# Patient Record
Sex: FEMALE | Race: WHITE | NOT HISPANIC OR LATINO | Employment: FULL TIME | URBAN - METROPOLITAN AREA
[De-identification: names, ages, dates, MRNs, and addresses within clinical notes are randomized per-mention and may not be internally consistent; named-entity substitution may affect disease eponyms.]

---

## 2017-01-03 ENCOUNTER — OFFICE VISIT (OUTPATIENT)
Dept: PEDIATRICS | Facility: CLINIC | Age: 57
End: 2017-01-03
Payer: COMMERCIAL

## 2017-01-03 VITALS
HEIGHT: 67 IN | BODY MASS INDEX: 23.39 KG/M2 | DIASTOLIC BLOOD PRESSURE: 72 MMHG | HEART RATE: 78 BPM | OXYGEN SATURATION: 98 % | TEMPERATURE: 97.9 F | SYSTOLIC BLOOD PRESSURE: 128 MMHG | WEIGHT: 149 LBS

## 2017-01-03 DIAGNOSIS — C44.211: ICD-10-CM

## 2017-01-03 DIAGNOSIS — M54.50 CHRONIC RIGHT-SIDED LOW BACK PAIN WITHOUT SCIATICA: ICD-10-CM

## 2017-01-03 DIAGNOSIS — G89.29 CHRONIC RIGHT-SIDED LOW BACK PAIN WITHOUT SCIATICA: ICD-10-CM

## 2017-01-03 DIAGNOSIS — Z23 ENCOUNTER FOR IMMUNIZATION: ICD-10-CM

## 2017-01-03 DIAGNOSIS — Z00.00 ROUTINE GENERAL MEDICAL EXAMINATION AT A HEALTH CARE FACILITY: Primary | ICD-10-CM

## 2017-01-03 PROCEDURE — 99396 PREV VISIT EST AGE 40-64: CPT | Performed by: INTERNAL MEDICINE

## 2017-01-03 RX ORDER — MULTIPLE VITAMINS W/ MINERALS TAB 9MG-400MCG
1 TAB ORAL DAILY
COMMUNITY

## 2017-01-03 NOTE — PROGRESS NOTES
SUBJECTIVE:     CC: Celeste Alcala is an 56 year old woman who presents for preventive health visit.     Physical  Annual:     Getting at least 3 servings of Calcium per day::  NO    Bi-annual eye exam::  Yes    Dental care twice a year::  Yes    Sleep apnea or symptoms of sleep apnea::  None    Diet::  Regular (no restrictions)    Frequency of exercise::  4-5 days/week    Duration of exercise::  15-30 minutes    Taking medications regularly::  Not Applicable    Additional concerns today::  YES (Cramping, but hasn't had her period, Back Pain )  did not have vaginal bleeding.  No constipation.  Going to [place] next month, staying condo.      Today's PHQ-2 Score:   PHQ-2 ( 1999 [employer]) 1/3/2017   Q1: Little interest or pleasure in doing things 0   Q2: Feeling down, depressed or hopeless 0   PHQ-2 Score 0   Little interest or pleasure in doing things -   Feeling down, depressed or hopeless -   PHQ-2 Score -       Abuse: Current or Past(Physical, Sexual or Emotional)- No  Do you feel safe in your environment - Yes    Social History   Substance Use Topics     Smoking status: Never Smoker      Smokeless tobacco: Never Used     Alcohol Use: Yes      Comment: few drinks per night on weekend     The patient does not drink >3 drinks per day nor >7 drinks per week.    Recent Labs   Lab Test 04/19/11  02/02/10   0918   CHOL  168  171   HDL  70.8  62   LDL  79.2  95   TRIG  90  66   CHOLHDLRATIO   --   2.8       Reviewed orders with patient.  Reviewed health maintenance and updated orders accordingly - Yes    Mammo Decision Support:  Patient over age 50, mutual decision to screen reflected in health maintenance.    Pertinent mammograms are reviewed under the imaging tab.  History of abnormal Pap smear: NO - age 30-65 PAP every 5 years with negative HPV co-testing recommended  All Histories reviewed and updated in Epic.      ROS:  C: NEGATIVE for fever, chills, change in weight  I: NEGATIVE for worrisome rashes, moles or  "lesions  E: NEGATIVE for vision changes or irritation  ENT: NEGATIVE for ear, mouth and throat problems  R: NEGATIVE for significant cough or SOB  B: NEGATIVE for masses, tenderness or discharge  CV: NEGATIVE for chest pain, palpitations or peripheral edema  GI: NEGATIVE for nausea, abdominal pain, heartburn, or change in bowel habits  : NEGATIVE for unusual urinary or vaginal symptoms. No vaginal bleeding.  M: NEGATIVE for significant arthralgias or myalgia other than pain rt low back to buttock without weakness or neuro symptoms   N: NEGATIVE for weakness, dizziness or paresthesias  P: NEGATIVE for changes in mood or affect     Problem list, Medication list, Allergies, and Medical/Social/Surgical histories reviewed in [facility] and updated as appropriate.  Labs reviewed in EPIC  OBJECTIVE:     /72 mmHg  Pulse 78  Temp(Src) 97.9  F (36.6  C) (Oral)  Ht 5' 7\" (1.702 m)  Wt 149 lb (67.586 kg)  BMI 23.33 kg/m2  SpO2 98%  EXAM:  GENERAL APPEARANCE: healthy, alert and no distress  EYES: Eyes grossly normal to inspection, PERRL and conjunctivae and sclerae normal  HENT: ear canals and TM's normal, nose and mouth without ulcers or lesions, oropharynx clear and oral mucous membranes moist  NECK: no adenopathy, no asymmetry, masses, or scars and thyroid normal to palpation  RESP: lungs clear to auscultation - no rales, rhonchi or wheezes  BREAST: normal without masses, tenderness or nipple discharge and no palpable axillary masses or adenopathy  CV: regular rate and rhythm, normal S1 S2, no S3 or S4, no murmur, click or rub, no peripheral edema and peripheral pulses strong  ABDOMEN: soft, nontender, no hepatosplenomegaly, no masses and bowel sounds normal  MS: no musculoskeletal defects are noted and gait is age appropriate without ataxia  SKIN: no suspicious lesions or rashes  NEURO: Normal strength and tone, sensory exam grossly normal, mentation intact and speech normal  PSYCH: mentation appears normal and " "affect normal/bright    ASSESSMENT/PLAN:     1. Routine general medical examination at a health care facility  Routine health education discussed: calcium, diet, exercise, weight, safety.   - Glucose; Future  - Lipid panel reflex to direct LDL; Future  - Hepatitis C Screen Reflex to HCV RNA Quant and Genotype; Future    2. 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 Encounter for immunization  Counseling provided regarding the benefits and risks related to the vaccines ordered today. I reviewed the signs and symptoms of adverse effects and when to seek medical care if they should arise.   - HEPATITIS A VACCINE (ADULT)    4. Chronic right-sided low back pain without sciatica  Refer for PT  - [facility] PT, HAND, AND CHIROPRACTIC REFERRAL    COUNSELING:  Reviewed preventive health counseling, as reflected in patient instructions    BP Screening:   Last 3 BP Readings:    BP Readings from Last 3 Encounters:   01/03/17 128/72   09/22/15 126/92   08/25/15 110/70       The following was recommended to the patient:  Re-screen BP within a year and recommended lifestyle modifications       reports that she has never smoked. She has never used smokeless tobacco.    Estimated body mass index is 22.39 kg/(m^2) as calculated from the following:    Height as of 8/25/15: 5' 7\" (1.702 m).    Weight as of 9/22/15: 143 lb (64.864 kg).       Counseling Resources:  ATP IV Guidelines  Pooled Cohorts Equation Calculator  Breast Cancer Risk Calculator  FRAX Risk Assessment  ICSI Preventive Guidelines  Dietary Guidelines for Americans, 2010  USDA's MyPlate  ASA Prophylaxis  Lung CA Screening    Alicia Fisher MD  [facility] [place]    Answers for HPI/ROS submitted by the patient on 12/29/2016   PHQ-2 Depressed: Not at all, Not at all  PHQ-2 Score: 0      "

## 2017-01-03 NOTE — LETTER
[facility]  [address]  [place] [address]  [phone]      June 16, 2017      Celeste Alcala  [address]  [place] [address]              Dear Celeste Alcala    This is to remind you that your  Hep C Screening, Blood Sugar and Lipid profile is due. You may call our office at [phone] to schedule a lab only appointment.    Please disregard this notice if you have had this lab work done or already made an appointment.     Note: If you are scheduled for a cholesterol or any diabetic lab tests, you will need to fast for 12 hours prior to your lab appointment.        Sincerely,    Alicia Fisher MD

## 2017-01-03 NOTE — PATIENT INSTRUCTIONS
Preventive Health Recommendations  Female Ages 50 - 64    Yearly exam: See your health care provider every year in order to  o Review health changes.   o Discuss preventive care.    o Review your medicines if your doctor has prescribed any.      If you get Pap tests with HPV test, you only need to test every 5 years, unless you have an abnormal result. You are due in 2018      Have a mammogram every 1 to 2 years.    Have a colonoscopy in 2022    Have a cholesterol test every 5 years, or more often if advised.  You are due this year, please schedule a fasting lab-only.  You can have water, black coffee and black tea ONLY for 8hrs prior to the blood draw.     Have a diabetes test (fasting glucose) every three years.       Shots: Get a flu shot each year. Get a tetanus shot every 10 years.  You are due in 2020    Nutrition:     Eat at least 5 servings of fruits and vegetables each day.    Eat whole-grain bread, whole-wheat pasta and brown rice instead of white grains and rice.    Talk to your provider about Calcium and Vitamin D.     Lifestyle    Exercise at least 150 minutes a week (30 minutes a day, 5 days a week). This will help you control your weight and prevent disease.    Limit alcohol to one drink per day.    No smoking.     Wear sunscreen to prevent skin cancer.     See your dentist every six months for an exam and cleaning.    See your eye doctor every 1 to 2 years.    Let me know if the cramping returns and I would order a pelvic ultrasound.    They will call you to schedule physical therapy

## 2017-01-03 NOTE — NURSING NOTE
"Chief Complaint   Patient presents with     Physical       Initial /72 mmHg  Pulse 78  Temp(Src) 97.9  F (36.6  C) (Oral)  Ht 5' 7\" (1.702 m)  Wt 149 lb (67.586 kg)  BMI 23.33 kg/m2  SpO2 98% Estimated body mass index is 23.33 kg/(m^2) as calculated from the following:    Height as of this encounter: 5' 7\" (1.702 m).    Weight as of this encounter: 149 lb (67.586 kg).  BP completed using cuff size: shelby Arellano MA      "

## 2017-01-03 NOTE — MR AVS SNAPSHOT
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   o Discuss preventive care.    o Review your medicines if your doctor has prescribed any.      If you get Pap tests with HPV test, you only need to test every 5 years, unless you have an abnormal result. You are due in 2018      Have a mammogram every 1 to 2 years.    Have a colonoscopy in 2022    Have a cholesterol test every 5 years, or more often if advised.  You are due this year, please schedule a fasting lab-only.  You can have water, black coffee and black tea ONLY for 8hrs prior to the blood draw.     Have a diabetes test (fasting glucose) every three years.       Shots: Get a flu shot each year. Get a tetanus shot every 10 years.  You are due in 2020    Nutrition:     Eat at least 5 servings of fruits and vegetables each day.    Eat whole-grain bread, whole-wheat pasta and brown rice instead of white grains and rice.    Talk to your provider about Calcium and Vitamin D.     Lifestyle    Exercise at least 150 minutes a week (30 minutes a day, 5 days a week). This will help you control your weight and prevent disease.    Limit alcohol to one drink per day.    No smoking.     Wear sunscreen to prevent skin cancer.     See your dentist every six months for an exam and cleaning.    See your eye doctor every 1 to 2  years.    Let me know if the cramping returns and I would order a pelvic ultrasound.    They will call you to schedule physical therapy        Follow-ups after your visit        Additional Services     [facility] PT, HAND, AND CHIROPRACTIC REFERRAL       === This order will print in the [facility] Scheduling  Office ===    Physical therapy, hand therapy and chiropractic care are available through:    [facility]  [facility]    Call one easy number to schedule at any of the above locations:  [phone].    Your provider has referred you to Physical Therapy at [facility] or [facility]    Indication/Reason for Referral: back pain  Onset of Illness:  yr  Therapy Orders:  Evaluate and Treat  Special Programs:  None  Special Request:  None    Additional Comments for the therapist or chiropractor:      Please be aware that coverage of these services is subject to the terms and limitations of your health insurance plan.  Call member services at your health plan with any benefit or coverage questions.      Please bring the following to your appointment:    >>   Your personal calendar for scheduling future appointments  >>   Comfortable clothing                  Future tests that were ordered for you today     Open Future Orders        Priority Expected Expires Ordered    Glucose Routine 3/4/2017 4/3/2017 1/3/2017    Lipid panel reflex to direct LDL Routine 3/4/2017 4/5/2017 1/3/2017    Hepatitis C Screen Reflex to HCV RNA Quant and Genotype Routine 4/3/2017 5/3/2017 1/3/2017            Who to contact     If you have questions or need follow up information about today's clinic visit or your schedule please contact [facility] [place] directly at [phone].  Normal or non-critical lab and imaging results will be communicated to you by MyChart, letter or phone within 4 business days after the clinic has received the results. If you do not hear from us within 7 days, please contact  "the clinic through [employer] or phone. If you have a critical or abnormal lab result, we will notify you by phone as soon as possible.  Submit refill requests through [employer] or call your pharmacy and they will forward the refill request to us. Please allow 3 business days for your refill to be completed.          Additional Information About Your Visit        [employer] Information     [employer] gives you secure access to your electronic health record. If you see a primary care provider, you can also send messages to your care team and make appointments. If you have questions, please call your primary care clinic.  If you do not have a primary care provider, please call [phone] and they will assist you.        Care EveryWhere ID     This is your Care EveryWhere ID. This could be used by other organizations to access your [facility] medical records  [id number]        Your Vitals Were     Pulse Temperature Height BMI (Body Mass Index) Pulse Oximetry       78 97.9  F (36.6  C) (Oral) 5' 7\" (1.702 m) 23.33 kg/m2 98%        Blood Pressure from Last 3 Encounters:   01/03/17 128/72   09/22/15 126/92   08/25/15 110/70    Weight from Last 3 Encounters:   01/03/17 149 lb (67.586 kg)   09/22/15 143 lb (64.864 kg)   08/25/15 146 lb 8 oz (66.452 kg)              We Performed the Following     HEPATITIS A VACCINE (ADULT)     [facility] PT, HAND, AND CHIROPRACTIC REFERRAL        Primary Care Provider Office Phone # Fax #    Alicia Fisher [phone]       [facility] [address] [place] [address]        Thank you!     Thank you for choosing [facility]  for your care. Our goal is always to provide you with excellent care. Hearing back from our patients is one way we can continue to improve our services. Please take a few minutes to complete the written survey that you may receive in the mail after your visit with us. Thank you!             Your Updated Medication List - Protect others around you: " Learn how to safely use, store and throw away your medicines at www.disposemymeds.org.          This list is accurate as of: 1/3/17  5:09 PM.  Always use your most recent med list.                   Brand Name Dispense Instructions for use    CALCIUM CARBONATE PO          fluticasone 50 MCG/ACT spray    FLONASE    16 g    Spray 1-2 sprays into both nostrils daily       Multi-vitamin Tabs tablet      Take 1 tablet by mouth daily

## 2017-01-05 ENCOUNTER — THERAPY VISIT (OUTPATIENT)
Dept: PHYSICAL THERAPY | Facility: CLINIC | Age: 57
End: 2017-01-05
Payer: COMMERCIAL

## 2017-01-05 DIAGNOSIS — M54.50 LUMBAGO: Primary | ICD-10-CM

## 2017-01-05 PROCEDURE — 97110 THERAPEUTIC EXERCISES: CPT | Mod: GP | Performed by: PHYSICAL THERAPIST

## 2017-01-05 PROCEDURE — 97161 PT EVAL LOW COMPLEX 20 MIN: CPT | Mod: GP | Performed by: PHYSICAL THERAPIST

## 2017-01-05 NOTE — PROGRESS NOTES
Subjective:                                       Pertinent medical history includes:  Cancer.    Other surgeries include:  Orthopedic surgery (Knee).    Current occupation is .    Primary job tasks include:  Prolonged sitting and other (Computer work).                              Objective:    System    Physical Exam    General     ROS    Assessment/Plan:

## 2017-01-12 ENCOUNTER — THERAPY VISIT (OUTPATIENT)
Dept: PHYSICAL THERAPY | Facility: CLINIC | Age: 57
End: 2017-01-12
Payer: COMMERCIAL

## 2017-01-12 DIAGNOSIS — M54.50 LUMBAGO: Primary | ICD-10-CM

## 2017-01-12 PROCEDURE — 97112 NEUROMUSCULAR REEDUCATION: CPT | Mod: GP | Performed by: PHYSICAL THERAPIST

## 2017-01-12 PROCEDURE — 97110 THERAPEUTIC EXERCISES: CPT | Mod: GP | Performed by: PHYSICAL THERAPIST

## 2017-01-23 ENCOUNTER — THERAPY VISIT (OUTPATIENT)
Dept: PHYSICAL THERAPY | Facility: CLINIC | Age: 57
End: 2017-01-23
Payer: COMMERCIAL

## 2017-01-23 DIAGNOSIS — M54.50 LUMBAGO: Primary | ICD-10-CM

## 2017-01-23 PROCEDURE — 97110 THERAPEUTIC EXERCISES: CPT | Mod: GP | Performed by: PHYSICAL THERAPIST

## 2017-01-23 PROCEDURE — 97112 NEUROMUSCULAR REEDUCATION: CPT | Mod: GP | Performed by: PHYSICAL THERAPIST

## 2017-03-23 NOTE — PROGRESS NOTES
"Subjective:    HPI                    Objective:    System    Physical Exam    General     ROS    Assessment/Plan:      DISCHARGE REPORT    Progress reporting period is from 1/5/2017 to 3/23/2017.       SUBJECTIVE  Patient's current status is unknown.  She did not complete therapy as planned.  Subjective note when patient was last seen on 1/23/2017: Patient reports symptoms are improving. HEP is going well.  Sitting tolerance is improving.  She reports less \"sciatic\" pain.        OBJECTIVE  Patient has failed to return to therapy so current objective findings are unknown. The objective findings below are from DOS 1/23/2017: Lumbar extension ROM: 90% after Rx.     ASSESSMENT/PLAN  Updated problem list and treatment plan: Diagnosis 1:  Lumbar derangement    STG/LTGs have been met or progress has been made towards goals:  Yes  Assessment of Progress: The patient has not returned to therapy. Current status is unknown.  Self Management Plans:  Patient has been instructed in a home treatment program.  Patient is independent in a home treatment program.    D/C PT since Celeste has not returned.        "

## 2018-02-14 ENCOUNTER — TELEPHONE (OUTPATIENT)
Dept: PEDIATRICS | Facility: CLINIC | Age: 58
End: 2018-02-14

## 2018-02-14 NOTE — TELEPHONE ENCOUNTER
Panel Management Review      Patient has the following on her problem list: None      Composite cancer screening  Chart review shows that this patient is due/due soon for the following Mammogram  Summary:    Patient is due/failing the following:   MAMMOGRAM and PHYSICAL    Action needed:   Patient needs office visit for physical. and Patient needs referral/order: mammogram    Type of outreach:    Sent [employer] message.    Questions for provider review:    None                                                                                                                                    Adriana Kuhn MA       Chart routed to self .

## 2018-02-21 ENCOUNTER — RADIANT APPOINTMENT (OUTPATIENT)
Dept: MAMMOGRAPHY | Facility: CLINIC | Age: 58
End: 2018-02-21
Attending: INTERNAL MEDICINE
Payer: COMMERCIAL

## 2018-02-21 DIAGNOSIS — Z00.00 PREVENTATIVE HEALTH CARE: ICD-10-CM

## 2018-02-21 PROCEDURE — 77067 SCR MAMMO BI INCL CAD: CPT | Mod: TC

## 2018-02-21 PROCEDURE — 77063 BREAST TOMOSYNTHESIS BI: CPT | Mod: TC

## 2018-03-02 NOTE — TELEPHONE ENCOUNTER
Panel Management Review      Patient has the following on her problem list: None      Composite cancer screening  Chart review shows that this patient is due/due soon for the following None  Summary:    Patient is due/failing the following:   LDL and PHYSICAL    Action needed:   Patient needs office visit for physical.    Type of outreach:    Phone, left message for patient to call back.     Questions for provider review:    None                                                                                                                                    Adriana Kuhn MA       Chart routed to none . 2nd attempt

## 2018-03-14 NOTE — TELEPHONE ENCOUNTER
Panel Management Review      Patient has the following on her problem list: None      Composite cancer screening  Chart review shows that this patient is due/due soon for the following None  Summary:    Patient is due/failing the following:   LDL and PHYSICAL    Action needed:   Patient needs office visit for physical.    Type of outreach:    none pt has appt scheduled 4/3/18.    Questions for provider review:    None                                                                                                                                    Adriana Kuhn MA       Chart routed to none .  Postponing until scheduled date

## 2018-04-03 ENCOUNTER — OFFICE VISIT (OUTPATIENT)
Dept: PEDIATRICS | Facility: CLINIC | Age: 58
End: 2018-04-03
Payer: COMMERCIAL

## 2018-04-03 VITALS
DIASTOLIC BLOOD PRESSURE: 82 MMHG | WEIGHT: 142.5 LBS | TEMPERATURE: 98.9 F | HEIGHT: 67 IN | BODY MASS INDEX: 22.37 KG/M2 | HEART RATE: 76 BPM | OXYGEN SATURATION: 99 % | SYSTOLIC BLOOD PRESSURE: 124 MMHG

## 2018-04-03 DIAGNOSIS — Z13.220 LIPID SCREENING: ICD-10-CM

## 2018-04-03 DIAGNOSIS — Z13.1 SCREENING FOR DIABETES MELLITUS: ICD-10-CM

## 2018-04-03 DIAGNOSIS — M70.61 TROCHANTERIC BURSITIS OF RIGHT HIP: ICD-10-CM

## 2018-04-03 DIAGNOSIS — Z12.4 SCREENING FOR CERVICAL CANCER: ICD-10-CM

## 2018-04-03 DIAGNOSIS — Z13.6 CARDIOVASCULAR SCREENING; LDL GOAL LESS THAN 160: ICD-10-CM

## 2018-04-03 DIAGNOSIS — Z00.00 ROUTINE GENERAL MEDICAL EXAMINATION AT A HEALTH CARE FACILITY: Primary | ICD-10-CM

## 2018-04-03 DIAGNOSIS — Z11.59 NEED FOR HEPATITIS C SCREENING TEST: ICD-10-CM

## 2018-04-03 PROCEDURE — 87624 HPV HI-RISK TYP POOLED RSLT: CPT | Performed by: INTERNAL MEDICINE

## 2018-04-03 PROCEDURE — G0123 SCREEN CERV/VAG THIN LAYER: HCPCS | Performed by: INTERNAL MEDICINE

## 2018-04-03 PROCEDURE — 99396 PREV VISIT EST AGE 40-64: CPT | Performed by: INTERNAL MEDICINE

## 2018-04-03 ASSESSMENT — ENCOUNTER SYMPTOMS
ARTHRALGIAS: 1
NAUSEA: 0
COUGH: 0
WEAKNESS: 0
HEADACHES: 0
SHORTNESS OF BREATH: 0
HEMATOCHEZIA: 0
DYSURIA: 0
CONSTIPATION: 0
FEVER: 0
PALPITATIONS: 0
DIARRHEA: 0
JOINT SWELLING: 0
HEMATURIA: 0
HEARTBURN: 0
ABDOMINAL PAIN: 0
EYE PAIN: 0
NERVOUS/ANXIOUS: 0
FREQUENCY: 0
DIZZINESS: 0
SORE THROAT: 0
CHILLS: 0
PARESTHESIAS: 0

## 2018-04-03 NOTE — LETTER
April 11, 2018    Celeste Alcala  [address]  [place] [address]    Dear Celeste,  We are happy to inform you that your PAP smear result from 4/3/18 is normal.  We are now able to do a follow up test on PAP smears. The DNA test is for HPV (Human Papilloma Virus). Cervical cancer is closely linked with certain types of HPV. Your results showed no evidence of high risk HPV.  Therefore we recommend you return in 5 years for your next pap smear and HPV test.  You will still need to return to the clinic every year for an annual exam and other preventive tests.  Please contact the clinic at [phone] with any questions.  Sincerely,    Alicia Fisher MD/matthew

## 2018-04-03 NOTE — PATIENT INSTRUCTIONS
Preventive Health Recommendations  Female Ages 50 - 64    Yearly exam: See your health care provider every year in order to  o Review health changes.   o Discuss preventive care.    o Review your medicines if your doctor has prescribed any.      If you get Pap tests with HPV test, you only need to test every 5 years, unless you have an abnormal result.       Have a mammogram every 1 to 2 years.    Have a colonoscopy in 2022      Have a cholesterol test every 5 years, or more often if advised.    Have a diabetes test (fasting glucose) every three years. If you are at risk for diabetes, you should have this test more often.     Schedule your fasting labs.  You can have You can schedule on [employer] or by calling us at [phone].       Shots: Get a flu shot each year. Get a tetanus shot every 10 years.  You are due in 2020.    Nutrition:     Eat at least 5 servings of fruits and vegetables each day.    Eat whole-grain bread, whole-wheat pasta and brown rice instead of white grains and rice.    Talk to your provider about Calcium and Vitamin D.     Lifestyle    Exercise at least 150 minutes a week (30 minutes a day, 5 days a week). This will help you control your weight and prevent disease.    Limit alcohol to one drink per day.    No smoking.     Wear sunscreen to prevent skin cancer.     See your dentist every six months for an exam and cleaning.    See your eye doctor every 1 to 2 years.    Try putting allergy proof covers on your pillows to see if it helps your congestion.    Try taking 2 aleve twice daily with food for 2 weeks to see if that will just decrease your inflammation and stop the pain.  If that does not do the trick, send me a message - either physical therapy or seeing sports medicine specialist is the next step.

## 2018-04-03 NOTE — MR AVS SNAPSHOT
After Visit Summary   4/3/2018    Celeste Alcala    MRN: [id number]           Patient Information     Date Of Birth          1960        Visit Information        Provider Department      4/3/2018 5:50 PM Alicia Fisher MD [facility]        Today's Diagnoses     Routine general medical examination at a health care facility    -  1    CARDIOVASCULAR SCREENING; LDL GOAL LESS THAN 160        Lipid screening        Screening for diabetes mellitus        Need for hepatitis C screening test        Screening for cervical cancer        Trochanteric bursitis of right hip          Care Instructions      Preventive Health Recommendations  Female Ages 50 - 64    Yearly exam: See your health care provider every year in order to  o Review health changes.   o Discuss preventive care.    o Review your medicines if your doctor has prescribed any.      If you get Pap tests with HPV test, you only need to test every 5 years, unless you have an abnormal result.       Have a mammogram every 1 to 2 years.    Have a colonoscopy in 2022      Have a cholesterol test every 5 years, or more often if advised.    Have a diabetes test (fasting glucose) every three years. If you are at risk for diabetes, you should have this test more often.     Schedule your fasting labs.  You can have You can schedule on [employer] or by calling us at [phone].       Shots: Get a flu shot each year. Get a tetanus shot every 10 years.  You are due in 2020.    Nutrition:     Eat at least 5 servings of fruits and vegetables each day.    Eat whole-grain bread, whole-wheat pasta and brown rice instead of white grains and rice.    Talk to your provider about Calcium and Vitamin D.     Lifestyle    Exercise at least 150 minutes a week (30 minutes a day, 5 days a week). This will help you control your weight and prevent disease.    Limit alcohol to one drink per day.    No smoking.     Wear sunscreen to prevent skin cancer.     See  your dentist every six months for an exam and cleaning.    See your eye doctor every 1 to 2 years.    Try putting allergy proof covers on your pillows to see if it helps your congestion.    Try taking 2 aleve twice daily with food for 2 weeks to see if that will just decrease your inflammation and stop the pain.  If that does not do the trick, send me a message - either physical therapy or seeing sports medicine specialist is the next step.            Follow-ups after your visit        Follow-up notes from your care team     Return in about 1 year (around 4/3/2019) for Physical Exam.      Future tests that were ordered for you today     Open Future Orders        Priority Expected Expires Ordered    Lipid panel reflex to direct LDL Fasting Routine 4/3/2018 4/3/2019 4/3/2018    **Glucose FUTURE anytime Routine 4/3/2018 4/3/2019 4/3/2018    **Hepatitis C Screen Reflex to RNA FUTURE anytime Routine 4/3/2018 4/3/2019 4/3/2018            Who to contact     If you have questions or need follow up information about today's clinic visit or your schedule please [facility] at [phone].  Normal or non-critical lab and imaging results will be communicated to you by [employer], letter or phone within 4 business days after the clinic has received the results. If you do not hear from us within 7 days, please contact the clinic through [employer] or phone. If you have a critical or abnormal lab result, we will notify you by phone as soon as possible.  Submit refill requests through [employer] or call your pharmacy and they will forward the refill request to us. Please allow 3 business days for your refill to be completed.          Additional Information About Your Visit        [employer] Information     [employer] gives you secure access to your electronic health record. If you see a primary care provider, you can also send messages to your care team and make appointments. If you have questions, please call your  "primary care clinic.  If you do not have a primary care provider, please call [phone] and they will assist you.        Care EveryWhere ID     This is your Care EveryWhere ID. This could be used by other organizations to access your [facility] medical records  [id number]        Your Vitals Were     Pulse Temperature Height Pulse Oximetry BMI (Body Mass Index)       76 98.9  F (37.2  C) (Tympanic) 5' 7\" (1.702 m) 99% 22.32 kg/m2        Blood Pressure from Last 3 Encounters:   04/03/18 124/82   01/03/17 128/72   09/22/15 (!) 126/92    Weight from Last 3 Encounters:   04/03/18 142 lb 8 oz (64.6 kg)   01/03/17 149 lb (67.6 kg)   09/22/15 143 lb (64.9 kg)              We Performed the Following     HPV High Risk Types DNA Cervical     Pap imaged thin layer screen with HPV - recommended age 30 - 65 years (select HPV order below)        Primary Care Provider Office Phone # Fax #    Alicia Fisher [phone] [address] [place] [address]        Equal Access to Services     [facility]: Hadii aad ravi hadasho Soladonnaali, waaxda luqadaha, qaybta kaalmada adewilmerda, merlyn benavides. So [facility] [phone].    ATENCIÓN: Si habla español, tiene a mota disposición servicios gratuitos de asistencia lingüística. Llame al [phone].    We comply with applicable federal civil rights laws and [place] laws. We do not discriminate on the basis of race, color, national origin, age, disability, sex, sexual orientation, or gender identity.            Thank you!     Thank you for choosing [facility] [place]  for your care. Our goal is always to provide you with excellent care. Hearing back from our patients is one way we can continue to improve our services. Please take a few minutes to complete the written survey that you may receive in the mail after your visit with us. Thank you!             Your Updated Medication List - Protect others around you: Learn how to " safely use, store and throw away your medicines at www.disposemymeds.org.          This list is accurate as of 4/3/18  6:18 PM.  Always use your most recent med list.                   Brand Name Dispense Instructions for use Diagnosis    CALCIUM CARBONATE PO           fluticasone 50 MCG/ACT spray    FLONASE    16 g    Spray 1-2 sprays into both nostrils daily    Nasal congestion       Multi-vitamin Tabs tablet      Take 1 tablet by mouth daily

## 2018-04-03 NOTE — PROGRESS NOTES
SUBJECTIVE:   CC: Celeste Alcala is an 58 year old woman who presents for preventive health visit.     Physical   Annual:     Getting at least 3 servings of Calcium per day::  Yes    Bi-annual eye exam::  Yes    Dental care twice a year::  Yes    Sleep apnea or symptoms of sleep apnea::  Excessive snoring    Diet::  Regular (no restrictions)    Frequency of exercise::  4-5 days/week    Duration of exercise::  15-30 minutes    Taking medications regularly::  No    Barriers to taking medications::  Problems remembering to take them    Medication side effects::  None    Additional concerns today::  YES (back/hip pain x 6months)           congested at night.    Back and hip pains - did some PT sessions a year ago.  Not sure that it helped.  Did do home exercises.  This summer got back pain, more in rt than lt - just above waistband.  Went to chiropracter.  First day after manipulations, felt better.  Rt lat hip pain, intermittent but very bothersome, chelsea in some positions - sitting and sleeping.      Today's PHQ-2 Score:   PHQ-2 ( 1999 [employer]) 4/3/2018   Q1: Little interest or pleasure in doing things 0   Q2: Feeling down, depressed or hopeless 0   PHQ-2 Score 0   Q1: Little interest or pleasure in doing things Not at all   Q2: Feeling down, depressed or hopeless Not at all   PHQ-2 Score 0       Abuse: Current or Past(Physical, Sexual or Emotional)- No  Do you feel safe in your environment - Yes    Social History   Substance Use Topics     Smoking status: Never Smoker     Smokeless tobacco: Never Used     Alcohol use Yes      Comment: few drinks per night on weekend     Alcohol Use 4/3/2018   If you drink alcohol do you typically have greater than 3 drinks per day OR greater than 7 drinks per week? No       Reviewed orders with patient.  Reviewed health maintenance and updated orders accordingly - Yes  Labs reviewed in [facility]    Patient over age 50, mutual decision to screen reflected in health  "maintenance.    Pertinent mammograms are reviewed under the imaging tab.  History of abnormal Pap smear: NO - age 30-65 PAP every 5 years with negative HPV co-testing recommended    Reviewed and updated as needed this visit by clinical staff  Tobacco  Allergies  Meds  Problems  Med Hx  Surg Hx  Fam Hx  Soc Hx          Reviewed and updated as needed this visit by Provider  Allergies  Meds  Problems            Review of Systems   Constitutional: Negative for chills and fever.   HENT: Positive for congestion. Negative for ear pain, hearing loss and sore throat.    Eyes: Positive for visual disturbance. Negative for pain.   Respiratory: Negative for cough and shortness of breath.    Cardiovascular: Negative for chest pain, palpitations and peripheral edema.   Gastrointestinal: Negative for abdominal pain, constipation, diarrhea, heartburn, hematochezia and nausea.   Genitourinary: Negative for dysuria, frequency, genital sores, hematuria, pelvic pain, urgency, vaginal bleeding and vaginal discharge.   Musculoskeletal: Positive for arthralgias. Negative for joint swelling.   Skin: Negative for rash.   Neurological: Negative for dizziness, weakness, headaches and paresthesias.   Psychiatric/Behavioral: Negative for mood changes. The patient is not nervous/anxious.    intermittently notes both eyes blurry vision.  Then will go away, not sure how long it lasts.     OBJECTIVE:   /82 (BP Location: Right arm, Patient Position: Chair, Cuff Size: Adult Regular)  Pulse 76  Temp 98.9  F (37.2  C) (Tympanic)  Ht 5' 7\" (1.702 m)  Wt 142 lb 8 oz (64.6 kg)  SpO2 99%  BMI 22.32 kg/m2  Physical Exam  GENERAL: healthy, alert and no distress  EYES: Eyes grossly normal to inspection, PERRL and conjunctivae and sclerae normal  HENT: ear canals and TM's normal, nose and mouth without ulcers or lesions  NECK: no adenopathy, no asymmetry, masses, or scars and thyroid normal to palpation  RESP: lungs clear to auscultation " "- no rales, rhonchi or wheezes  CV: regular rate and rhythm, normal S1 S2, no S3 or S4, no murmur, click or rub, no peripheral edema and peripheral pulses strong  ABDOMEN: soft, nontender, no hepatosplenomegaly, no masses and bowel sounds normal   (female): normal female external genitalia, normal urethral meatus , vaginal mucosa pink, moist, well rugated and normal cervix  MS: no gross musculoskeletal defects noted, no edema  SKIN: no suspicious lesions or rashes  NEURO: Normal strength and tone, mentation intact and speech normal  PSYCH: mentation appears normal, affect normal/bright    ASSESSMENT/PLAN:   1. Routine general medical examination at a health care facility  Routine health education discussed: calcium, diet, exercise, weight, safety.     2. CARDIOVASCULAR SCREENING; LDL GOAL LESS THAN 160    - Lipid panel reflex to direct LDL Fasting; Future    3. Lipid screening      4. Screening for diabetes mellitus    - **Glucose FUTURE anytime; Future    5. Need for hepatitis C screening test    - **Hepatitis C Screen Reflex to RNA FUTURE anytime; Future    6. Screening for cervical cancer    - Pap imaged thin layer screen with HPV - recommended age 30 - 65 years (select HPV order below)  - HPV High Risk Types DNA Cervical    7. Trochanteric bursitis of right hip  nsaid and notify if not better 2 weeks for referral      COUNSELING:  Reviewed preventive health counseling, as reflected in patient instructions    BP Screening:   Last 3 BP Readings:    BP Readings from Last 3 Encounters:   04/03/18 124/82   01/03/17 128/72   09/22/15 (!) 126/92       The following was recommended to the patient:  Re-screen BP within a year and recommended lifestyle modifications     reports that she has never smoked. She has never used smokeless tobacco.    Estimated body mass index is 22.32 kg/(m^2) as calculated from the following:    Height as of this encounter: 5' 7\" (1.702 m).    Weight as of this encounter: 142 lb 8 oz (64.6 " kg).       Counseling Resources:  ATP IV Guidelines  Pooled Cohorts Equation Calculator  Breast Cancer Risk Calculator  FRAX Risk Assessment  ICSI Preventive Guidelines  Dietary Guidelines for Americans, 2010  USDA's MyPlate  ASA Prophylaxis  Lung CA Screening    Alicia Fisher MD  [facility]

## 2018-04-09 LAB
COPATH REPORT: NORMAL
PAP: NORMAL

## 2018-04-10 LAB
FINAL DIAGNOSIS: NORMAL
HPV HR 12 DNA CVX QL NAA+PROBE: NEGATIVE
HPV16 DNA SPEC QL NAA+PROBE: NEGATIVE
HPV18 DNA SPEC QL NAA+PROBE: NEGATIVE
SPECIMEN DESCRIPTION: NORMAL
SPECIMEN SOURCE CVX/VAG CYTO: NORMAL

## 2018-05-21 ENCOUNTER — RADIANT APPOINTMENT (OUTPATIENT)
Dept: GENERAL RADIOLOGY | Facility: CLINIC | Age: 58
End: 2018-05-21
Attending: FAMILY MEDICINE
Payer: COMMERCIAL

## 2018-05-21 ENCOUNTER — OFFICE VISIT (OUTPATIENT)
Dept: ORTHOPEDICS | Facility: CLINIC | Age: 58
End: 2018-05-21
Payer: COMMERCIAL

## 2018-05-21 VITALS
DIASTOLIC BLOOD PRESSURE: 76 MMHG | HEIGHT: 67 IN | BODY MASS INDEX: 22.13 KG/M2 | WEIGHT: 141 LBS | SYSTOLIC BLOOD PRESSURE: 128 MMHG

## 2018-05-21 DIAGNOSIS — G89.29 CHRONIC RIGHT-SIDED LOW BACK PAIN, WITH SCIATICA PRESENCE UNSPECIFIED: Primary | ICD-10-CM

## 2018-05-21 DIAGNOSIS — G89.29 CHRONIC RIGHT-SIDED LOW BACK PAIN, WITH SCIATICA PRESENCE UNSPECIFIED: ICD-10-CM

## 2018-05-21 DIAGNOSIS — M54.5 CHRONIC RIGHT-SIDED LOW BACK PAIN, WITH SCIATICA PRESENCE UNSPECIFIED: Primary | ICD-10-CM

## 2018-05-21 DIAGNOSIS — M54.5 CHRONIC RIGHT-SIDED LOW BACK PAIN, WITH SCIATICA PRESENCE UNSPECIFIED: ICD-10-CM

## 2018-05-21 DIAGNOSIS — M41.126 ADOLESCENT IDIOPATHIC SCOLIOSIS OF LUMBAR REGION: ICD-10-CM

## 2018-05-21 DIAGNOSIS — M62.830 LUMBAR PARASPINAL MUSCLE SPASM: ICD-10-CM

## 2018-05-21 PROCEDURE — 72100 X-RAY EXAM L-S SPINE 2/3 VWS: CPT

## 2018-05-21 PROCEDURE — 99244 OFF/OP CNSLTJ NEW/EST MOD 40: CPT | Performed by: FAMILY MEDICINE

## 2018-05-21 RX ORDER — CYCLOBENZAPRINE HCL 5 MG
TABLET ORAL
Qty: 30 TABLET | Refills: 0 | Status: SHIPPED | OUTPATIENT
Start: 2018-05-21 | End: 2019-07-23

## 2018-05-21 RX ORDER — METHYLPREDNISOLONE 4 MG
TABLET, DOSE PACK ORAL
Qty: 21 TABLET | Refills: 0 | Status: SHIPPED | OUTPATIENT
Start: 2018-05-21 | End: 2018-09-24

## 2018-05-21 ASSESSMENT — ENCOUNTER SYMPTOMS
TINGLING: 0
FOCAL WEAKNESS: 0
MYALGIAS: 1
SENSORY CHANGE: 0

## 2018-05-21 NOTE — LETTER
5/21/2018         RE: Celeste Alcala  [address]  [place] [address]        Dear Colleague,    Thank you for referring your patient, Celeste Alcala, to the [facility]. Please see a copy of my visit note below.    [facility]   Clinic Visit s May 21, 2018    PCP: Alicia Fisher      Celeste is a 58 year old female who is seen in consultation at the request of Dr. Fisher for   Chief Complaint   Patient presents with     Musculoskeletal Problem     R sided low back/hip pain       Injury: Patient reports insidious onset without acute precipitating event.     Location of Pain: right low back/SI joint, radiating to right hip and down to the right ankle   Duration of Pain: 6 month(s)  Rating of Pain at worst: 7/10  Rating of Pain Currently: 3/10  Pain is better with: laying on her stomach at night, repositioning   Pain is worse with: laying on her left side causes right lateral hip pain, sitting/driving, leaning back in chair  Treatment so far consists of: physical therapy in January 2017 for lower back, chiropractic care, Aleve  Associated symptoms:  crepitus in right hip, pain that moves from the lower back to the hip and down the right leg  Recent imaging completed: none.  Prior History of related problems: scoliosis, procedure in the right knee due to leg length discrepancy     Sciatica early 2017, more on RIGHT side, similar RLE pain, no neuropathy.    Social History: works in project management- computer based     Past Medical History:   Diagnosis Date     Unspecified sinusitis (chronic)        Patient Active Problem List    Diagnosis Date Noted     Lumbago 01/05/2017     Priority: Medium     Basal cell carcinoma of auricle of ear 01/30/2013     Priority: Medium     CARDIOVASCULAR SCREENING; LDL GOAL LESS THAN 160 02/10/2010     Priority: Medium     [place] 10-year CHD Risk Score: 1% (5 Total Points)   Values used to calculate score:     Age: 51  "years -- Points: 3     Total Cholesterol: 171 mg/dL -- Points: 3     HDL Cholesterol: 62 mg/dL -- Points: -1     Systolic BP (untreated): 108 mmHg -- Points: 0    The patient is not a smoker. -- Points: 0    The patient has not been diagnosed with diabetes. -- Points: 0    The patient does not have a family history of CHD. -- Points:0           Family History   Problem Relation Age of Onset     Thyroid Disease Mother      graves     Musculoskeletal Disorder Sister      MS     Other Cancer Sister        Social History     Social History     Marital status:      Spouse name: N/A     Number of children: N/A     Years of education: N/A     Occupational History     [employer]      Social History Main Topics     Smoking status: Never Smoker     Smokeless tobacco: Never Used     Alcohol use Yes      Comment: few drinks per night on weekend     Drug use: No       Past Surgical History:   Procedure Laterality Date     BIOPSY      melanoma biopsy     C NONSPECIFIC PROCEDURE      (R) knee surgery-leg length discrepency     C NONSPECIFIC PROCEDURE      T&A     C NONSPECIFIC PROCEDURE       x 2     COLONOSCOPY  2012    Procedure:COLONOSCOPY;  Colonoscopy   ; Surgeon:BRIAN NAGEL; Location: GI     ENT SURGERY      tonsils             Review of Systems   Musculoskeletal: Positive for joint pain and myalgias.   Neurological: Negative for tingling, sensory change and focal weakness.   All other systems reviewed and are negative.        Physical Exam  /76  Ht 5' 7\" (1.702 m)  Wt 141 lb (64 kg)  BMI 22.08 kg/m2  Constitutional:well-developed, well-nourished, and in no distress.   Cardiovascular: Intact distal pulses.    Neurological: alert. Gait Normal:   Gait, station, stance, and balance appear normal for age  Skin: Skin is warm and dry.   Psychiatric: Mood and affect normal.   Respiratory: unlabored, speaks in full sentences  Lymph: no LAD, no lymphangitis          Left Hip Exam   Gait: " Normal.    Right Hip Exam   Gait: Normal.    Tenderness   None    Range of Motion   Extension:            Normal  Flexion:                 Normal  Internal Rotation:  Normal  External Rotation: Normal  Abduction:            Normal  Adduction:            Normal    Muscle Strength   Abduction:  5/5  Adduction:  5/5  Flexion:      5/5    Tests   Tamela: Negative  Lydia:  Negative    Back Exam   Sensation: Normal.  Gait: Normal.    Tenderness   None    Range of Motion   Flexion:                   Extension:                 Lateral Bend Left:    20  Lateral Bend Right:  20  Rotation Right:         70  Rotation Left:           60  SLR    Right: Negative  Left:    Negative    Muscle Strength   Normal back strength        X-ray images Ordered and independently reviewed by me in the office today with the patient. X-ray shows: convex to LEFT scoliosis, suspect L5-S1 facet arthropathy.  ASSESSMENT/PLAN    ICD-10-CM    1. Chronic right-sided low back pain, with sciatica presence unspecified M54.5 XR Lumbar Spine 2/3 Views    G89.29 methylPREDNISolone (MEDROL DOSEPAK) 4 MG tablet     [facility]     methylPREDNISolone (MEDROL DOSEPAK) 4 MG tablet   2. Adolescent idiopathic scoliosis of lumbar region M41.126 [facility]   3. Lumbar paraspinal muscle spasm M62.830 methylPREDNISolone (MEDROL DOSEPAK) 4 MG tablet     methylPREDNISolone (MEDROL DOSEPAK) 4 MG tablet     cyclobenzaprine (FLEXERIL) 5 MG tablet     Mild radiographic degenerative changes along with long-standing history of scoliosis.  With some radicular pain but without neurogenic involvement, will proceed with oral steroids and physical therapy.  Okay for Flexeril for spasms affecting sleep.  Return if not making progress after 4 visits.  Anticipate need for MRI if not resolving.    Again, thank you for allowing me to participate in the care of your patient.        Sincerely,        Oc Abdul MD

## 2018-05-21 NOTE — MR AVS SNAPSHOT
After Visit Summary   5/21/2018    Celeste Alcala    MRN: [id number]           Patient Information     Date Of Birth          1960        Visit Information        Provider Department      5/21/2018 8:00 AM Oc Abdul MD [facility]        Today's Diagnoses     Chronic right-sided low back pain, with sciatica presence unspecified    -  1    Adolescent idiopathic scoliosis of lumbar region        Lumbar paraspinal muscle spasm           Follow-ups after your visit        Additional Services     [facility] PT, HAND, AND CHIROPRACTIC REFERRAL       **This order will print in the [facility] Scheduling Office**    Physical Therapy, Hand Therapy and Chiropractic Care are available through:    *[facility]  *[facility]  *[facility]    Call one number to schedule at any of the above locations: [phone].    Your provider has referred you to: Physical Therapy at [facility] or [facility]    Indication/Reason for Referral:    Chronic right-sided low back pain, with sciatica presence unspecified  Adolescent idiopathic scoliosis of lumbar region    Onset of Illness:   Therapy Orders: Evaluate and Treat  Special Programs: None  Special Request: None    Jasmina Freedman      Additional Comments for the Therapist or Chiropractor:       Please be aware that coverage of these services is subject to the terms and limitations of your health insurance plan.  Call member services at your health plan with any benefit or coverage questions.      Please bring the following to your appointment:    *Your personal calendar for scheduling future appointments  *Comfortable clothing                  Who to contact     If you have questions or need follow up information about today's clinic visit or your schedule please contact [facility] directly at [phone].  Normal or non-critical lab and imaging results will be communicated to you by  "MyChart, letter or phone within 4 business days after the clinic has received the results. If you do not hear from us within 7 days, please contact the clinic through [employer] or phone. If you have a critical or abnormal lab result, we will notify you by phone as soon as possible.  Submit refill requests through [employer] or call your pharmacy and they will forward the refill request to us. Please allow 3 business days for your refill to be completed.          Additional Information About Your Visit        nivioharEpoq Information     [employer] gives you secure access to your electronic health record. If you see a primary care provider, you can also send messages to your care team and make appointments. If you have questions, please call your primary care clinic.  If you do not have a primary care provider, please call [phone] and they will assist you.        Care EveryWhere ID     This is your Care EveryWhere ID. This could be used by other organizations to access your [place] medical records  [id number]        Your Vitals Were     Height BMI (Body Mass Index)                5' 7\" (1.702 m) 22.08 kg/m2           Blood Pressure from Last 3 Encounters:   05/21/18 128/76   04/03/18 124/82   01/03/17 128/72    Weight from Last 3 Encounters:   05/21/18 141 lb (64 kg)   04/03/18 142 lb 8 oz (64.6 kg)   01/03/17 149 lb (67.6 kg)              We Performed the Following     [facility] PT, HAND, AND CHIROPRACTIC REFERRAL          Today's Medication Changes          These changes are accurate as of 5/21/18 12:00 PM.  If you have any questions, ask your nurse or doctor.               Start taking these medicines.        Dose/Directions    cyclobenzaprine 5 MG tablet   Commonly known as:  FLEXERIL   Used for:  Lumbar paraspinal muscle spasm   Started by:  Oc Abdul MD        Take 1-2 tabs po at night as needed for back spasm preventing sleep.   Quantity:  30 tablet   Refills:  0       * methylPREDNISolone 4 MG tablet "   Commonly known as:  MEDROL DOSEPAK   Used for:  Chronic right-sided low back pain, with sciatica presence unspecified, Lumbar paraspinal muscle spasm   Started by:  Oc Abdul MD        Follow package instructions   Quantity:  21 tablet   Refills:  0       * methylPREDNISolone 4 MG tablet   Commonly known as:  MEDROL DOSEPAK   Used for:  Chronic right-sided low back pain, with sciatica presence unspecified, Lumbar paraspinal muscle spasm   Started by:  Oc Abdul MD        Follow package instructions   Quantity:  21 tablet   Refills:  0       * Notice:  This list has 2 medication(s) that are the same as other medications prescribed for you. Read the directions carefully, and ask your doctor or other care provider to review them with you.         Where to get your medicines      These medications were sent to [facility] [address] [address] [facility]  [address] [facility] [address]     Phone:  [phone]     cyclobenzaprine 5 MG tablet    methylPREDNISolone 4 MG tablet    methylPREDNISolone 4 MG tablet                Primary Care Provider Office Phone # Fax #    Alicia Fisher [phone]       [facility] [place] [address]        Equal Access to Services     [facility] AH: Hadii sana ku hadasho Soomaali, waaxda luqadaha, qaybta kaalmada adezinayaabby, [place] . So [facility] [phone].    ATENCIÓN: Si habla español, tiene a mota disposición servicios gratuitos de asistencia lingüística. [facility] [phone].    We comply with applicable federal civil rights laws and [place] laws. We do not discriminate on the basis of race, color, national origin, age, disability, sex, sexual orientation, or gender identity.            Thank you!     Thank you for choosing [facility]  for your care. Our goal is always to provide you with excellent care. Hearing back from our  patients is one way we can continue to improve our services. Please take a few minutes to complete the written survey that you may receive in the mail after your visit with us. Thank you!             Your Updated Medication List - Protect others around you: Learn how to safely use, store and throw away your medicines at www.disposemymeds.org.          This list is accurate as of 5/21/18 12:00 PM.  Always use your most recent med list.                   Brand Name Dispense Instructions for use Diagnosis    CALCIUM CARBONATE PO           cyclobenzaprine 5 MG tablet    FLEXERIL    30 tablet    Take 1-2 tabs po at night as needed for back spasm preventing sleep.    Lumbar paraspinal muscle spasm       fluticasone 50 MCG/ACT spray    FLONASE    16 g    Spray 1-2 sprays into both nostrils daily    Nasal congestion       * methylPREDNISolone 4 MG tablet    MEDROL DOSEPAK    21 tablet    Follow package instructions    Chronic right-sided low back pain, with sciatica presence unspecified, Lumbar paraspinal muscle spasm       * methylPREDNISolone 4 MG tablet    MEDROL DOSEPAK    21 tablet    Follow package instructions    Chronic right-sided low back pain, with sciatica presence unspecified, Lumbar paraspinal muscle spasm       Multi-vitamin Tabs tablet      Take 1 tablet by mouth daily        * Notice:  This list has 2 medication(s) that are the same as other medications prescribed for you. Read the directions carefully, and ask your doctor or other care provider to review them with you.

## 2018-05-21 NOTE — PROGRESS NOTES
[facility]   Clinic Visit s May 21, 2018    PCP: Alicia Fisher      Celeste is a 58 year old female who is seen in consultation at the request of Dr. Fisher for   Chief Complaint   Patient presents with     Musculoskeletal Problem     R sided low back/hip pain       Injury: Patient reports insidious onset without acute precipitating event.     Location of Pain: right low back/SI joint, radiating to right hip and down to the right ankle   Duration of Pain: 6 month(s)  Rating of Pain at worst: 7/10  Rating of Pain Currently: 3/10  Pain is better with: laying on her stomach at night, repositioning   Pain is worse with: laying on her left side causes right lateral hip pain, sitting/driving, leaning back in chair  Treatment so far consists of: physical therapy in January 2017 for lower back, chiropractic care, Aleve  Associated symptoms:  crepitus in right hip, pain that moves from the lower back to the hip and down the right leg  Recent imaging completed: none.  Prior History of related problems: scoliosis, procedure in the right knee due to leg length discrepancy     Sciatica early 2017, more on RIGHT side, similar RLE pain, no neuropathy.    Social History: works in project management- computer based     Past Medical History:   Diagnosis Date     Unspecified sinusitis (chronic)        Patient Active Problem List    Diagnosis Date Noted     Lumbago 01/05/2017     Priority: Medium     Basal cell carcinoma of auricle of ear 01/30/2013     Priority: Medium     CARDIOVASCULAR SCREENING; LDL GOAL LESS THAN 160 02/10/2010     Priority: Medium     [place] 10-year CHD Risk Score: 1% (5 Total Points)   Values used to calculate score:     Age: 51 years -- Points: 3     Total Cholesterol: 171 mg/dL -- Points: 3     HDL Cholesterol: 62 mg/dL -- Points: -1     Systolic BP (untreated): 108 mmHg -- Points: 0    The patient is not a smoker. -- Points: 0    The patient has not been diagnosed with  "diabetes. -- Points: 0    The patient does not have a family history of CHD. -- Points:0           Family History   Problem Relation Age of Onset     Thyroid Disease Mother      graves     Musculoskeletal Disorder Sister      MS     Other Cancer Sister        Social History     Social History     Marital status:      Spouse name: N/A     Number of children: N/A     Years of education: N/A     Occupational History     [employer]      Social History Main Topics     Smoking status: Never Smoker     Smokeless tobacco: Never Used     Alcohol use Yes      Comment: few drinks per night on weekend     Drug use: No       Past Surgical History:   Procedure Laterality Date     BIOPSY      melanoma biopsy     C NONSPECIFIC PROCEDURE      (R) knee surgery-leg length discrepency     C NONSPECIFIC PROCEDURE      T&A     C NONSPECIFIC PROCEDURE       x 2     COLONOSCOPY  2012    Procedure:COLONOSCOPY;  Colonoscopy   ; Surgeon:BRIAN NAGEL; Location: GI     ENT SURGERY      tonsils             Review of Systems   Musculoskeletal: Positive for joint pain and myalgias.   Neurological: Negative for tingling, sensory change and focal weakness.   All other systems reviewed and are negative.        Physical Exam  /76  Ht 5' 7\" (1.702 m)  Wt 141 lb (64 kg)  BMI 22.08 kg/m2  Constitutional:well-developed, well-nourished, and in no distress.   Cardiovascular: Intact distal pulses.    Neurological: alert. Gait Normal:   Gait, station, stance, and balance appear normal for age  Skin: Skin is warm and dry.   Psychiatric: Mood and affect normal.   Respiratory: unlabored, speaks in full sentences  Lymph: no LAD, no lymphangitis          Left Hip Exam   Gait: Normal.    Right Hip Exam   Gait: Normal.    Tenderness   None    Range of Motion   Extension:            Normal  Flexion:                 Normal  Internal Rotation:  Normal  External Rotation: Normal  Abduction:            Normal  Adduction:            " Normal    Muscle Strength   Abduction:  5/5  Adduction:  5/5  Flexion:      5/5    Tests   Tamela: Negative  Lydia:  Negative    Back Exam   Sensation: Normal.  Gait: Normal.    Tenderness   None    Range of Motion   Flexion:                   Extension:                 Lateral Bend Left:    20  Lateral Bend Right:  20  Rotation Right:         70  Rotation Left:           60  SLR    Right: Negative  Left:    Negative    Muscle Strength   Normal back strength        X-ray images Ordered and independently reviewed by me in the office today with the patient. X-ray shows: convex to LEFT scoliosis, suspect L5-S1 facet arthropathy.  ASSESSMENT/PLAN    ICD-10-CM    1. Chronic right-sided low back pain, with sciatica presence unspecified M54.5 XR Lumbar Spine 2/3 Views    G89.29 methylPREDNISolone (MEDROL DOSEPAK) 4 MG tablet     [facility] PT, HAND, AND CHIROPRACTIC REFERRAL     methylPREDNISolone (MEDROL DOSEPAK) 4 MG tablet   2. Adolescent idiopathic scoliosis of lumbar region M41.126 [facility] PT, HAND, AND CHIROPRACTIC REFERRAL   3. Lumbar paraspinal muscle spasm M62.830 methylPREDNISolone (MEDROL DOSEPAK) 4 MG tablet     methylPREDNISolone (MEDROL DOSEPAK) 4 MG tablet     cyclobenzaprine (FLEXERIL) 5 MG tablet     Mild radiographic degenerative changes along with long-standing history of scoliosis.  With some radicular pain but without neurogenic involvement, will proceed with oral steroids and physical therapy.  Okay for Flexeril for spasms affecting sleep.  Return if not making progress after 4 visits.  Anticipate need for MRI if not resolving.

## 2018-05-30 ENCOUNTER — THERAPY VISIT (OUTPATIENT)
Dept: PHYSICAL THERAPY | Facility: CLINIC | Age: 58
End: 2018-05-30
Attending: FAMILY MEDICINE
Payer: COMMERCIAL

## 2018-05-30 DIAGNOSIS — M54.50 LUMBAGO: Primary | ICD-10-CM

## 2018-05-30 PROCEDURE — 97161 PT EVAL LOW COMPLEX 20 MIN: CPT | Mod: GP | Performed by: PHYSICAL THERAPIST

## 2018-05-30 PROCEDURE — 97110 THERAPEUTIC EXERCISES: CPT | Mod: GP | Performed by: PHYSICAL THERAPIST

## 2018-05-30 PROCEDURE — 97112 NEUROMUSCULAR REEDUCATION: CPT | Mod: GP | Performed by: PHYSICAL THERAPIST

## 2018-05-30 NOTE — MR AVS SNAPSHOT
After Visit Summary   5/30/2018    Celeste Alcala    MRN: [id number]           Patient Information     Date Of Birth          1960        Visit Information        Provider Department      5/30/2018 8:10 AM Austyn Zayas PT [facility]        Today's Diagnoses     Lumbago    -  1       Follow-ups after your visit        Your next 10 appointments already scheduled     Jun 06, 2018  8:10 AM CDT   CAYLA Spine with Austyn Zayas PT   [facility] ([facility] [place]  )    [address]  [address]  [facility] [place] [facility] [address]   [phone]            Jun 13, 2018  7:30 AM CDT   CAYLA Spine with Elgin Silva PT   [facility] ([facility] [place]  )    [address] [place] [address]  [address]  [facility] [address]   [phone]              Who to contact     If you have questions or need follow up information about today's clinic visit or your schedule please contact [facility] directly at [phone].  Normal or non-critical lab and imaging results will be communicated to you by [employer], letter or phone within 4 business days after the clinic has received the results. If you do not hear from us within 7 days, please contact the clinic through cisimplet or phone. If you have a critical or abnormal lab result, we will notify you by phone as soon as possible.  Submit refill requests through [employer] or call your pharmacy and they will forward the refill request to us. Please allow 3 business days for your refill to be completed.          Additional Information About Your Visit        [employer] Information     [employer] gives you secure access to your electronic health record. If you see a primary care provider, you can also send messages to your care team and make appointments. If you have questions, please call your primary care clinic.  If you do not have a primary care provider, please call [phone] and they will  assist you.        Care EveryWhere ID     This is your Care EveryWhere ID. This could be used by other organizations to access your [facility] medical records  [id number]         Blood Pressure from Last 3 Encounters:   05/21/18 128/76   04/03/18 124/82   01/03/17 128/72    Weight from Last 3 Encounters:   05/21/18 64 kg (141 lb)   04/03/18 64.6 kg (142 lb 8 oz)   01/03/17 67.6 kg (149 lb)              We Performed the Following     HC PT EVAL, LOW COMPLEXITY     [facility] INITIAL EVAL REPORT     NEUROMUSCULAR RE-EDUCATION     THERAPEUTIC EXERCISES        Primary Care Provider Office Phone # Fax #    Alicia Fisher [phone] [address] [place] [address]        Equal Access to Services     [facility] : Hadii sana marianoo Soyves, waaxda luqadaha, qaybta kaalmada adeegyada, [place] . So [facility] [phone].    ATENCIÓN: Si habla español, tiene a mota disposición servicios gratuitos de asistencia lingüística. Llame al [phone].    We comply with applicable federal civil rights laws and [place] laws. We do not discriminate on the basis of race, color, national origin, age, disability, sex, sexual orientation, or gender identity.            Thank you!     Thank you for choosing [facility]  for your care. Our goal is always to provide you with excellent care. Hearing back from our patients is one way we can continue to improve our services. Please take a few minutes to complete the written survey that you may receive in the mail after your visit with us. Thank you!             Your Updated Medication List - Protect others around you: Learn how to safely use, store and throw away your medicines at www.disposemymeds.org.          This list is accurate as of 5/30/18  8:49 AM.  Always use your most recent med list.                   Brand Name Dispense Instructions for use Diagnosis    CALCIUM CARBONATE PO            cyclobenzaprine 5 MG tablet    FLEXERIL    30 tablet    Take 1-2 tabs po at night as needed for back spasm preventing sleep.    Lumbar paraspinal muscle spasm       fluticasone 50 MCG/ACT spray    FLONASE    16 g    Spray 1-2 sprays into both nostrils daily    Nasal congestion       * methylPREDNISolone 4 MG tablet    MEDROL DOSEPAK    21 tablet    Follow package instructions    Chronic right-sided low back pain, with sciatica presence unspecified, Lumbar paraspinal muscle spasm       * methylPREDNISolone 4 MG tablet    MEDROL DOSEPAK    21 tablet    Follow package instructions    Chronic right-sided low back pain, with sciatica presence unspecified, Lumbar paraspinal muscle spasm       Multi-vitamin Tabs tablet      Take 1 tablet by mouth daily        * Notice:  This list has 2 medication(s) that are the same as other medications prescribed for you. Read the directions carefully, and ask your doctor or other care provider to review them with you.

## 2018-05-30 NOTE — PROGRESS NOTES
[facility] Initial Evaluation  Subjective:  Patient is a 58 year old female presenting with rehab back hpi.   Celeste Alcala is a 58 year old female with a lumbar condition.  Condition occurred with:  Insidious onset.  Condition occurred: for unknown reasons.  This is a recurrent condition  Patient reports that she continues to have low back pain and right leg pain to the foot.  Pain comes and goes during the day.    Patient gets a pinch with certain movements (feels this in the back and gluteus).  Goes down the leg when the back is hurting more.    Worse usually in the morning.    Worse: sleeping, moving certain positions, sitting in a slouched position.  Better: with lumbar support, good posture, lying on stomach, standing up and walking around.    [place]ies:  [place]   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   Pain started around March 2017..        Pain is described as shooting, sharp and aching and is intermittent and reported as 7/10 and 3/10.   Pain is worse in the A.M..     Since onset symptoms are unchanged.    Previous treatment includes chiropractic and physical therapy.      Pertinent medical history includes:  None.    Other surgeries include:  No.  Current medications:  None as reported by the patient.  Current occupation is Office  .  Patient is working in normal job without restrictions.  Primary job tasks include:  Prolonged sitting.    Barriers include:  None as reported by the patient.    Red flags:  None as reported by the patient.                        Objective:        Flexibility/Screens:       Lower Extremity:  Decreased left lower extremity flexibility:Piriformis; Hip Flexors; IT Band; Quadriceps and Hamstrings    Decreased right lower extremity flexibility:  Piriformis; Hip Flexors; IT Band; Quadriceps and Hamstrings               Lumbar/SI Evaluation  ROM:      Strength: glute medius 4+/5, glute max 4+/5, hip flexion 5/5, low  bas 4+/5.   Lumbar Myotomes:  normal            Lumbar DTR's:  not assessed      Cord Signs:  normal    Lumbar Dermtomes:  normal                                                                         [facility] Lumbar Evaluation    Posture:  Sitting: good  Standing: good      Correction of Posture: better    Movement Loss:  Flexion (Flex): min  Extension (EXT): min  Side Glide R (SG R): nil  Side Walker L (SG L): nil                                               ROS    Assessment/Plan:    Patient is a 58 year old female with lumbar complaints.    Patient has the following significant findings with corresponding treatment plan.                Diagnosis 1:  Right low back pain   Pain -  hot/cold therapy, self management, education, directional preference exercise and home program  Decreased ROM/flexibility - manual therapy, therapeutic exercise and home program  Decreased strength - therapeutic exercise, therapeutic activities and home program  Impaired muscle performance - neuro re-education and home program  Decreased function - therapeutic activities and home program  Impaired posture - neuro re-education and home program    Therapy Evaluation Codes:   1) History comprised of:   Personal factors that impact the plan of care:      Time since onset of symptoms.    Comorbidity factors that impact the plan of care are:      None.     Medications impacting care: Steroids.  2) Examination of Body Systems comprised of:   Body structures and functions that impact the plan of care:      Lumbar spine.   Activity limitations that impact the plan of care are:      Bending, Sitting and Working.  3) Clinical presentation characteristics are:   Stable/Uncomplicated.  4) Decision-Making    Low complexity using standardized patient assessment instrument and/or measureable assessment of functional outcome.  Cumulative Therapy Evaluation is: Low complexity.    Previous and current functional limitations:  (See Goal Flow Sheet for this  information)    Short term and Long term goals: (See Goal Flow Sheet for this information)     Communication ability:  Patient appears to be able to clearly communicate and understand verbal and written communication and follow directions correctly.  Treatment Explanation - The following has been discussed with the patient:   RX ordered/plan of care  Anticipated outcomes  Possible risks and side effects  This patient would benefit from PT intervention to resume normal activities.   Rehab potential is excellent.    Frequency:  1 X week, once daily  Duration:  for 6 weeks  Discharge Plan:  Achieve all LTG.  Independent in home treatment program.  Reach maximal therapeutic benefit.    Please refer to the daily flowsheet for treatment today, total treatment time and time spent performing 1:1 timed codes.

## 2018-06-06 ENCOUNTER — THERAPY VISIT (OUTPATIENT)
Dept: PHYSICAL THERAPY | Facility: CLINIC | Age: 58
End: 2018-06-06
Attending: FAMILY MEDICINE
Payer: COMMERCIAL

## 2018-06-06 DIAGNOSIS — M54.50 LUMBAGO: ICD-10-CM

## 2018-06-06 PROCEDURE — 97110 THERAPEUTIC EXERCISES: CPT | Mod: GP | Performed by: PHYSICAL THERAPIST

## 2018-06-20 ENCOUNTER — THERAPY VISIT (OUTPATIENT)
Dept: PHYSICAL THERAPY | Facility: CLINIC | Age: 58
End: 2018-06-20
Attending: FAMILY MEDICINE
Payer: COMMERCIAL

## 2018-06-20 DIAGNOSIS — M54.50 LUMBAGO: ICD-10-CM

## 2018-06-20 PROCEDURE — 97112 NEUROMUSCULAR REEDUCATION: CPT | Mod: GP | Performed by: PHYSICAL THERAPIST

## 2018-06-20 PROCEDURE — 97110 THERAPEUTIC EXERCISES: CPT | Mod: GP | Performed by: PHYSICAL THERAPIST

## 2018-09-08 ENCOUNTER — MYC MEDICAL ADVICE (OUTPATIENT)
Dept: ORTHOPEDICS | Facility: CLINIC | Age: 58
End: 2018-09-08

## 2018-09-24 ENCOUNTER — OFFICE VISIT (OUTPATIENT)
Dept: ORTHOPEDICS | Facility: CLINIC | Age: 58
End: 2018-09-24
Payer: COMMERCIAL

## 2018-09-24 VITALS
HEIGHT: 67 IN | WEIGHT: 144 LBS | BODY MASS INDEX: 22.6 KG/M2 | SYSTOLIC BLOOD PRESSURE: 144 MMHG | DIASTOLIC BLOOD PRESSURE: 94 MMHG

## 2018-09-24 DIAGNOSIS — M54.41 CHRONIC RIGHT-SIDED LOW BACK PAIN WITH RIGHT-SIDED SCIATICA: Primary | ICD-10-CM

## 2018-09-24 DIAGNOSIS — G89.29 CHRONIC RIGHT-SIDED LOW BACK PAIN WITH RIGHT-SIDED SCIATICA: Primary | ICD-10-CM

## 2018-09-24 DIAGNOSIS — M41.126 ADOLESCENT IDIOPATHIC SCOLIOSIS OF LUMBAR REGION: ICD-10-CM

## 2018-09-24 PROCEDURE — 99214 OFFICE O/P EST MOD 30 MIN: CPT | Performed by: FAMILY MEDICINE

## 2018-09-24 ASSESSMENT — ENCOUNTER SYMPTOMS
TINGLING: 0
FOCAL WEAKNESS: 0
MYALGIAS: 1
SENSORY CHANGE: 0

## 2018-09-24 NOTE — LETTER
9/24/2018         RE: Celeste Alcala  [address]  [place] [address]        Dear Colleague,    Thank you for referring your patient, Celeste Alcala, to the  SPORTS MEDICINE. Please see a copy of my visit note below.    Musculoskeletal Problem   Associated symptoms include myalgias.      [facility]   Follow-up Visit s Sep 24, 2018    PCP: Alicia Fisher      Subjective:  Celeste is a 58 year old female who is seen in follow up for evaluation of   Chief Complaint   Patient presents with     Lower Back - Follow Up For, Pain     Her last visit was on 5/21/18.  Since that time, symptoms have been somewhat worse low back pain. Celeste Alcala is accompanied today by self.     Patient has noticed improved symptoms with Medrol Dose Pack and physical therapy treatment.  That has plateaued and now having worsening radiation to right posterior lateral thigh and lower leg.  Pain is located right lower lumbar spine with radiation to right LE.  Patient is using no aids.    Patient denies any new injuries.    Patient's past medical, surgical, social and family histories are reviewed today.    History from previous visit on Visit date not found       [facility]   Clinic Visit s May 21, 2018    PCP: Alicia Fisher      Celeste is a 58 year old female who is seen in consultation at the request of Dr. Fisher for   Chief Complaint   Patient presents with     Lower Back - Follow Up For, Pain       Injury: Patient reports insidious onset without acute precipitating event.     Location of Pain: right low back/SI joint, radiating to right hip and down to the right ankle   Duration of Pain: 6 month(s)  Rating of Pain at worst: 7/10  Rating of Pain Currently: 3/10  Pain is better with: laying on her stomach at night, repositioning   Pain is worse with: laying on her left side causes right lateral hip pain, sitting/driving, leaning back in chair  Treatment so far consists  of: physical therapy in January 2017 for lower back, chiropractic care, Aleve  Associated symptoms:  crepitus in right hip, pain that moves from the lower back to the hip and down the right leg  Recent imaging completed: none.  Prior History of related problems: scoliosis, procedure in the right knee due to leg length discrepancy     Sciatica early 2017, more on RIGHT side, similar RLE pain, no neuropathy.    Social History: works in project management- computer based     Past Medical History:   Diagnosis Date     Unspecified sinusitis (chronic)        Patient Active Problem List    Diagnosis Date Noted     Lumbago 01/05/2017     Priority: Medium     Basal cell carcinoma of auricle of ear 01/30/2013     Priority: Medium     CARDIOVASCULAR SCREENING; LDL GOAL LESS THAN 160 02/10/2010     Priority: Medium     [place] 10-year CHD Risk Score: 1% (5 Total Points)   Values used to calculate score:     Age: 51 years -- Points: 3     Total Cholesterol: 171 mg/dL -- Points: 3     HDL Cholesterol: 62 mg/dL -- Points: -1     Systolic BP (untreated): 108 mmHg -- Points: 0    The patient is not a smoker. -- Points: 0    The patient has not been diagnosed with diabetes. -- Points: 0    The patient does not have a family history of CHD. -- Points:0           Family History   Problem Relation Age of Onset     Thyroid Disease Mother      graves     Musculoskeletal Disorder Sister      MS     Other Cancer Sister        Social History     Social History     Marital status:      Spouse name: N/A     Number of children: N/A     Years of education: N/A     Occupational History     [employer]      Social History Main Topics     Smoking status: Never Smoker     Smokeless tobacco: Never Used     Alcohol use Yes      Comment: few drinks per night on weekend     Drug use: No       Past Surgical History:   Procedure Laterality Date     BIOPSY      melanoma biopsy     C NONSPECIFIC PROCEDURE      (R) knee surgery-leg length  "discrepency     C NONSPECIFIC PROCEDURE      T&A     C NONSPECIFIC PROCEDURE       x 2     COLONOSCOPY  2012    Procedure:COLONOSCOPY;  Colonoscopy   ; Surgeon:BRIAN NAGEL; Location: GI     ENT SURGERY      tonsils             Review of Systems   Musculoskeletal: Positive for joint pain and myalgias.   Neurological: Negative for tingling, sensory change and focal weakness.   All other systems reviewed and are negative.        Physical Exam  BP (!) 144/94  Ht 5' 7\" (1.702 m)  Wt 144 lb (65.3 kg)  BMI 22.55 kg/m2  Constitutional:well-developed, well-nourished, and in no distress.   Cardiovascular: Intact distal pulses.    Neurological: alert. Gait Normal:   Gait, station, stance, and balance appear normal for age  Skin: Skin is warm and dry.   Psychiatric: Mood and affect normal.   Respiratory: unlabored, speaks in full sentences  Lymph: no LAD, no lymphangitis          Back Exam   Sensation: Normal.  Gait: Normal.    Tenderness   None    Range of Motion   Flexion:                    Abnormal  Extension:                Normal  Lateral Bend Left:    20  Lateral Bend Right:  20  Rotation Right:         70  Rotation Left:           60  SLR    Right: Negative  Left:    Negative    Muscle Strength   Normal back strength    Comments:  Flexion limited to hands on knees        X-ray images previously ordered and independently reviewed by me again in the office today with the patient. X-ray shows: convex to LEFT scoliosis, suspect L5-S1 facet arthropathy.  ASSESSMENT/PLAN    ICD-10-CM    1. Chronic right-sided low back pain with right-sided sciatica M54.41 MR Lumbar Spine w/o Contrast    G89.29    2. Adolescent idiopathic scoliosis of lumbar region M41.126 MR Lumbar Spine w/o Contrast     Long-standing scoliosis of uncertain contribution, but she does have right-sided low back pain radiating down the right leg without weakness or sensory changes now over 9 months not responding to therapy or " medications.  Will proceed with MRI.    MRI ordered, Celeste instructed to return at least 2 days following MRI to review results and determine treatment plan     Imaging Scheduling Centers:            [facility] [phone]            [facility] [phone]            [facility] [phone]      Again, thank you for allowing me to participate in the care of your patient.        Sincerely,        Oc Abdul MD

## 2018-09-24 NOTE — MR AVS SNAPSHOT
After Visit Summary   9/24/2018    Celeste Alcala    MRN: [id number]           Patient Information     Date Of Birth          1960        Visit Information        Provider Department      9/24/2018 4:20 PM Oc Abdul MD  Sports Medicine        Today's Diagnoses     Chronic right-sided low back pain with right-sided sciatica    -  1    Adolescent idiopathic scoliosis of lumbar region          Care Instructions    Patient to follow up with Primary Care provider regarding elevated blood pressure.          Follow-ups after your visit        Future tests that were ordered for you today     Open Future Orders        Priority Expected Expires Ordered    MR Lumbar Spine w/o Contrast Routine  9/24/2019 9/24/2018            Who to contact     If you have questions or need follow up information about today's clinic visit or your schedule please contact  [facility] directly at [phone].  Normal or non-critical lab and imaging results will be communicated to you by Lavantehart, letter or phone within 4 business days after the clinic has received the results. If you do not hear from us within 7 days, please contact the clinic through Zafint or phone. If you have a critical or abnormal lab result, we will notify you by phone as soon as possible.  Submit refill requests through Fabkids or call your pharmacy and they will forward the refill request to us. Please allow 3 business days for your refill to be completed.          Additional Information About Your Visit        Lavantehart Information     Fab[employer] gives you secure access to your electronic health record. If you see a primary care provider, you can also send messages to your care team and make appointments. If you have questions, please call your primary care clinic.  If you do not have a primary care provider, please call [phone] and they will assist you.        Care EveryWhere ID     This is your Care EveryWhere ID. This could  "be used by other organizations to access your [place] medical records  [id number]        Your Vitals Were     Height BMI (Body Mass Index)                5' 7\" (1.702 m) 22.55 kg/m2           Blood Pressure from Last 3 Encounters:   09/24/18 (!) 144/94   05/21/18 128/76   04/03/18 124/82    Weight from Last 3 Encounters:   09/24/18 144 lb (65.3 kg)   05/21/18 141 lb (64 kg)   04/03/18 142 lb 8 oz (64.6 kg)               Primary Care Provider Office Phone # Fax #    Alicia Fisher [phone] [address] [place] [address]        Equal Access to Services     [facility] : Hadii sana marianoo Soyves, waaxda luqadaha, qaybta kaalmada adeegyada, merlyn benavides. So [facility] [phone].    ATENCIÓN: Si habla español, tiene a mota disposición servicios gratuitos de asistencia lingüística. [facility] [phone].    We comply with applicable federal civil rights laws and [place] laws. We do not discriminate on the basis of race, color, national origin, age, disability, sex, sexual orientation, or gender identity.            Thank you!     Thank you for choosing  SPORTS MEDICINE  for your care. Our goal is always to provide you with excellent care. Hearing back from our patients is one way we can continue to improve our services. Please take a few minutes to complete the written survey that you may receive in the mail after your visit with us. Thank you!             Your Updated Medication List - Protect others around you: Learn how to safely use, store and throw away your medicines at www.disposemymeds.org.          This list is accurate as of 9/24/18  4:46 PM.  Always use your most recent med list.                   Brand Name Dispense Instructions for use Diagnosis    CALCIUM CARBONATE PO           cyclobenzaprine 5 MG tablet    FLEXERIL    30 tablet    Take 1-2 tabs po at night as needed for back spasm preventing sleep.    Lumbar paraspinal muscle " spasm       fluticasone 50 MCG/ACT spray    FLONASE    16 g    Spray 1-2 sprays into both nostrils daily    Nasal congestion       Multi-vitamin Tabs tablet      Take 1 tablet by mouth daily

## 2018-09-24 NOTE — PROGRESS NOTES
Musculoskeletal Problem   Associated symptoms include myalgias.      [facility]   Follow-up Visit s Sep 24, 2018    PCP: Alicia Fisher      Subjective:  Celeste is a 58 year old female who is seen in follow up for evaluation of   Chief Complaint   Patient presents with     Lower Back - Follow Up For, Pain     Her last visit was on 5/21/18.  Since that time, symptoms have been somewhat worse low back pain. Celeste Alcala is accompanied today by self.     Patient has noticed improved symptoms with Medrol Dose Pack and physical therapy treatment.  That has plateaued and now having worsening radiation to right posterior lateral thigh and lower leg.  Pain is located right lower lumbar spine with radiation to right LE.  Patient is using no aids.    Patient denies any new injuries.    Patient's past medical, surgical, social and family histories are reviewed today.    History from previous visit on Visit date not found       [facility]   Clinic Visit s May 21, 2018    PCP: Alicia Fisher      Celeste is a 58 year old female who is seen in consultation at the request of Dr. Fisher for   Chief Complaint   Patient presents with     Lower Back - Follow Up For, Pain       Injury: Patient reports insidious onset without acute precipitating event.     Location of Pain: right low back/SI joint, radiating to right hip and down to the right ankle   Duration of Pain: 6 month(s)  Rating of Pain at worst: 7/10  Rating of Pain Currently: 3/10  Pain is better with: laying on her stomach at night, repositioning   Pain is worse with: laying on her left side causes right lateral hip pain, sitting/driving, leaning back in chair  Treatment so far consists of: physical therapy in January 2017 for lower back, chiropractic care, Aleve  Associated symptoms:  crepitus in right hip, pain that moves from the lower back to the hip and down the right leg  Recent imaging completed: none.  Prior  History of related problems: scoliosis, procedure in the right knee due to leg length discrepancy     Sciatica early , more on RIGHT side, similar RLE pain, no neuropathy.    Social History: works in project management- computer based     Past Medical History:   Diagnosis Date     Unspecified sinusitis (chronic)        Patient Active Problem List    Diagnosis Date Noted     Lumbago 2017     Priority: Medium     Basal cell carcinoma of auricle of ear 2013     Priority: Medium     CARDIOVASCULAR SCREENING; LDL GOAL LESS THAN 160 02/10/2010     Priority: Medium     [place] 10-year CHD Risk Score: 1% (5 Total Points)   Values used to calculate score:     Age: 51 years -- Points: 3     Total Cholesterol: 171 mg/dL -- Points: 3     HDL Cholesterol: 62 mg/dL -- Points: -1     Systolic BP (untreated): 108 mmHg -- Points: 0    The patient is not a smoker. -- Points: 0    The patient has not been diagnosed with diabetes. -- Points: 0    The patient does not have a family history of CHD. -- Points:0           Family History   Problem Relation Age of Onset     Thyroid Disease Mother      graves     Musculoskeletal Disorder Sister      MS     Other Cancer Sister        Social History     Social History     Marital status:      Spouse name: N/A     Number of children: N/A     Years of education: N/A     Occupational History     [employer]      Social History Main Topics     Smoking status: Never Smoker     Smokeless tobacco: Never Used     Alcohol use Yes      Comment: few drinks per night on weekend     Drug use: No       Past Surgical History:   Procedure Laterality Date     BIOPSY      melanoma biopsy     C NONSPECIFIC PROCEDURE      (R) knee surgery-leg length discrepency     C NONSPECIFIC PROCEDURE      T&A     C NONSPECIFIC PROCEDURE       x 2     COLONOSCOPY  2012    Procedure:COLONOSCOPY;  Colonoscopy   ; Surgeon:BRIAN NAGEL; Location: GI     ENT SURGERY      tonsils  "            Review of Systems   Musculoskeletal: Positive for joint pain and myalgias.   Neurological: Negative for tingling, sensory change and focal weakness.   All other systems reviewed and are negative.        Physical Exam  BP (!) 144/94  Ht 5' 7\" (1.702 m)  Wt 144 lb (65.3 kg)  BMI 22.55 kg/m2  Constitutional:well-developed, well-nourished, and in no distress.   Cardiovascular: Intact distal pulses.    Neurological: alert. Gait Normal:   Gait, station, stance, and balance appear normal for age  Skin: Skin is warm and dry.   Psychiatric: Mood and affect normal.   Respiratory: unlabored, speaks in full sentences  Lymph: no LAD, no lymphangitis          Back Exam   Sensation: Normal.  Gait: Normal.    Tenderness   None    Range of Motion   Flexion:                    Abnormal  Extension:                Normal  Lateral Bend Left:    20  Lateral Bend Right:  20  Rotation Right:         70  Rotation Left:           60  SLR    Right: Negative  Left:    Negative    Muscle Strength   Normal back strength    Comments:  Flexion limited to hands on knees        X-ray images previously ordered and independently reviewed by me again in the office today with the patient. X-ray shows: convex to LEFT scoliosis, suspect L5-S1 facet arthropathy.  ASSESSMENT/PLAN    ICD-10-CM    1. Chronic right-sided low back pain with right-sided sciatica M54.41 MR Lumbar Spine w/o Contrast    G89.29    2. Adolescent idiopathic scoliosis of lumbar region M41.126 MR Lumbar Spine w/o Contrast     Long-standing scoliosis of uncertain contribution, but she does have right-sided low back pain radiating down the right leg without weakness or sensory changes now over 9 months not responding to therapy or medications.  Will proceed with MRI.    MRI ordered, Celeste instructed to return at least 2 days following MRI to review results and determine treatment plan     Imaging Scheduling Centers:            [facility] - [phone]            [facility] " [facility] [phone]            [facility] [phone]

## 2018-10-02 ENCOUNTER — HOSPITAL ENCOUNTER (OUTPATIENT)
Dept: MRI IMAGING | Facility: CLINIC | Age: 58
Discharge: HOME | End: 2018-10-02
Attending: FAMILY MEDICINE | Admitting: FAMILY MEDICINE
Payer: COMMERCIAL

## 2018-10-02 DIAGNOSIS — G89.29 CHRONIC RIGHT-SIDED LOW BACK PAIN WITH RIGHT-SIDED SCIATICA: ICD-10-CM

## 2018-10-02 DIAGNOSIS — M41.126 ADOLESCENT IDIOPATHIC SCOLIOSIS OF LUMBAR REGION: ICD-10-CM

## 2018-10-02 DIAGNOSIS — M54.41 CHRONIC RIGHT-SIDED LOW BACK PAIN WITH RIGHT-SIDED SCIATICA: ICD-10-CM

## 2018-10-02 PROCEDURE — 72148 MRI LUMBAR SPINE W/O DYE: CPT

## 2018-10-04 ENCOUNTER — TRANSFERRED RECORDS (OUTPATIENT)
Dept: HEALTH INFORMATION MANAGEMENT | Facility: CLINIC | Age: 58
End: 2018-10-04

## 2018-10-17 ENCOUNTER — OFFICE VISIT (OUTPATIENT)
Dept: ORTHOPEDICS | Facility: CLINIC | Age: 58
End: 2018-10-17
Payer: COMMERCIAL

## 2018-10-17 VITALS
HEIGHT: 67 IN | WEIGHT: 144 LBS | SYSTOLIC BLOOD PRESSURE: 114 MMHG | BODY MASS INDEX: 22.6 KG/M2 | DIASTOLIC BLOOD PRESSURE: 77 MMHG

## 2018-10-17 DIAGNOSIS — M54.16 LUMBAR RADICULOPATHY: Primary | ICD-10-CM

## 2018-10-17 DIAGNOSIS — N28.1 RENAL CYST, LEFT: ICD-10-CM

## 2018-10-17 PROCEDURE — 99213 OFFICE O/P EST LOW 20 MIN: CPT | Performed by: FAMILY MEDICINE

## 2018-10-17 ASSESSMENT — ENCOUNTER SYMPTOMS
SENSORY CHANGE: 0
MYALGIAS: 1
TINGLING: 0
FOCAL WEAKNESS: 0

## 2018-10-17 NOTE — MR AVS SNAPSHOT
After Visit Summary   10/17/2018    Celeste Alcala    MRN: [id number]           Patient Information     Date Of Birth          1960        Visit Information        Provider Department      10/17/2018 8:20 AM Oc Abdul MD  Sports Medicine        Today's Diagnoses     Lumbar radiculopathy    -  1    Renal cyst, left           Follow-ups after your visit        Future tests that were ordered for you today     Open Future Orders        Priority Expected Expires Ordered    US Renal Limited Routine  10/17/2019 10/17/2018    XR Lumbar Sacral Transforaminal Inj Right Routine 10/17/2018 10/17/2019 10/17/2018            Who to contact     If you have questions or need follow up information about today's clinic visit or your schedule please contact  SPORTS MEDICINE directly at [phone].  Normal or non-critical lab and imaging results will be communicated to you by MyChart, letter or phone within 4 business days after the clinic has received the results. If you do not hear from us within 7 days, please contact the clinic through MyChart or phone. If you have a critical or abnormal lab result, we will notify you by phone as soon as possible.  Submit refill requests through [employer] or call your pharmacy and they will forward the refill request to us. Please allow 3 business days for your refill to be completed.          Additional Information About Your Visit        MyChart Information     [employer] gives you secure access to your electronic health record. If you see a primary care provider, you can also send messages to your care team and make appointments. If you have questions, please call your primary care clinic.  If you do not have a primary care provider, please call [phone] and they will assist you.        Care EveryWhere ID     This is your Care EveryWhere ID. This could be used by other organizations to access your [place] medical records  [id number]        Your Vitals  "Were     Height BMI (Body Mass Index)                5' 7\" (1.702 m) 22.55 kg/m2           Blood Pressure from Last 3 Encounters:   10/17/18 114/77   09/24/18 (!) 144/94   05/21/18 128/76    Weight from Last 3 Encounters:   10/17/18 144 lb (65.3 kg)   09/24/18 144 lb (65.3 kg)   05/21/18 141 lb (64 kg)               Primary Care Provider Office Phone # Fax #    Alicia Fisher [phone] [address] [place] [address]        Equal Access to Services     [facility]: Hadii sana rodrigues hadrosi Soyves, waaxda luqadaha, qaybta kaalmada kristine, merlyn hill . So [facility] [phone].    ATENCIÓN: Si habla español, tiene a mota disposición servicios gratuitos de asistencia lingüística. [facility] [phone].    We comply with applicable federal civil rights laws and [place] laws. We do not discriminate on the basis of race, color, national origin, age, disability, sex, sexual orientation, or gender identity.            Thank you!     Thank you for choosing  SPORTS MEDICINE  for your care. Our goal is always to provide you with excellent care. Hearing back from our patients is one way we can continue to improve our services. Please take a few minutes to complete the written survey that you may receive in the mail after your visit with us. Thank you!             Your Updated Medication List - Protect others around you: Learn how to safely use, store and throw away your medicines at www.disposemymeds.org.          This list is accurate as of 10/17/18 11:59 PM.  Always use your most recent med list.                   Brand Name Dispense Instructions for use Diagnosis    CALCIUM CARBONATE PO           cyclobenzaprine 5 MG tablet    FLEXERIL    30 tablet    Take 1-2 tabs po at night as needed for back spasm preventing sleep.    Lumbar paraspinal muscle spasm       fluticasone 50 MCG/ACT spray    FLONASE    16 g    Spray 1-2 sprays into both nostrils daily    " Nasal congestion       Multi-vitamin Tabs tablet      Take 1 tablet by mouth daily

## 2018-10-17 NOTE — PROGRESS NOTES
Musculoskeletal Problem   Associated symptoms include myalgias.      [facility]   Follow-up Visit s Oct 17, 2018    PCP: Alicia Fisher      Subjective:  Celeste is a 58 year old female who is seen in follow up for evaluation of   Chief Complaint   Patient presents with     Right Hip - RECHECK     Her last visit was on 9/24/2018.  Since that time, symptoms have been worse than before. Celeste Alcala is accompanied today by self.       Patient had a lumbar spine MRI since last visit.      Patient has noticed a fluctuating course of symptoms with imaging treatment.    Pain is located right lateral hip, waxing and waning.  Patient is using no aids.    Patient denies any new injuries.    Patient's past medical, surgical, social and family histories are reviewed today.    Patient has noticed improved symptoms with Medrol Dose Pack and physical therapy treatment.  That has plateaued and now having worsening radiation to right posterior lateral thigh and lower leg.    Treatment so far consists of: physical therapy in January 2017 for lower back, chiropractic care, Aleve    Sciatica early 2017, more on RIGHT side, similar RLE pain, no neuropathy.    Social History: works in project management- computer based     Past Medical History:   Diagnosis Date     Unspecified sinusitis (chronic)        Patient Active Problem List    Diagnosis Date Noted     Lumbago 01/05/2017     Priority: Medium     Basal cell carcinoma of auricle of ear 01/30/2013     Priority: Medium     CARDIOVASCULAR SCREENING; LDL GOAL LESS THAN 160 02/10/2010     Priority: Medium     [place] 10-year CHD Risk Score: 1% (5 Total Points)   Values used to calculate score:     Age: 51 years -- Points: 3     Total Cholesterol: 171 mg/dL -- Points: 3     HDL Cholesterol: 62 mg/dL -- Points: -1     Systolic BP (untreated): 108 mmHg -- Points: 0    The patient is not a smoker. -- Points: 0    The patient has not been diagnosed with  "diabetes. -- Points: 0    The patient does not have a family history of CHD. -- Points:0           Family History   Problem Relation Age of Onset     Thyroid Disease Mother      graves     Musculoskeletal Disorder Sister      MS     Other Cancer Sister        Social History     Social History     Marital status:      Spouse name: N/A     Number of children: N/A     Years of education: N/A     Occupational History     [employer]      Social History Main Topics     Smoking status: Never Smoker     Smokeless tobacco: Never Used     Alcohol use Yes      Comment: few drinks per night on weekend     Drug use: No       Past Surgical History:   Procedure Laterality Date     BIOPSY      melanoma biopsy     C NONSPECIFIC PROCEDURE      (R) knee surgery-leg length discrepency     C NONSPECIFIC PROCEDURE      T&A     C NONSPECIFIC PROCEDURE       x 2     COLONOSCOPY  2012    Procedure:COLONOSCOPY;  Colonoscopy   ; Surgeon:BRIAN NAGEL; Location: GI     ENT SURGERY      tonsils             Review of Systems   Musculoskeletal: Positive for joint pain and myalgias.   Neurological: Negative for tingling, sensory change and focal weakness.   All other systems reviewed and are negative.        Physical Exam  /77  Ht 5' 7\" (1.702 m)  Wt 144 lb (65.3 kg)  BMI 22.55 kg/m2  Constitutional:well-developed, well-nourished, and in no distress.   Cardiovascular: Intact distal pulses.    Neurological: alert. Gait Normal:   Gait, station, stance, and balance appear normal for age  Skin: Skin is warm and dry.   Psychiatric: Mood and affect normal.   Respiratory: unlabored, speaks in full sentences  Lymph: no LAD, no lymphangitis          Back Exam   Sensation: Normal.  Gait: Normal.    Tenderness   None    Range of Motion   Flexion:                    Abnormal  Extension:                Normal  Lateral Bend Left:    20  Lateral Bend Right:  20  Rotation Right:         70  Rotation Left:           60  SLR  "   Right: Negative  Left:    Negative    Muscle Strength   Normal back strength    Comments:  Flexion limited to hands on knees        Recent Results (from the past 744 hour(s))   MR Lumbar Spine w/o Contrast    Narrative    MRI LUMBAR SPINE WITHOUT CONTRAST   10/2/2018 8:04 AM     HISTORY: Nearly 9 months right low back pain radiating down right  thigh, not resolving with meds and physical therapy. Chronic  right-sided low back pain with right-sided sciatica. Adolescent  idiopathic scoliosis of lumbar region.    TECHNIQUE: Multiplanar multisequence MRI of the lumbar spine without  contrast.    COMPARISON: None.    FINDINGS: The report is dictated assuming five lumbar-type vertebral  bodies.  Sagittal images demonstrate normal vertebral body height.  Bone marrow signal is unremarkable. Tip of the conus medullaris and  cauda equina are unremarkable. Mild left convex lumbar scoliosis  centered at the L3 level.    T12-L1:  No disc herniation or stenosis. Facet joints are  unremarkable.    L1-L2:  No disc herniation or stenosis. Facet joints are unremarkable.       L2-L3:  Degenerative disc disease with mild loss of disc space height  and mild disc bulge. No stenosis. Facet joints are unremarkable.    L3-L4:  No disc herniation or stenosis. Facet joints are unremarkable.      L4-L5:  Broad-based disc bulge lateralizes to the right. There is no  central stenosis. There is contact but no compression of the  descending right L5 nerve root in the subarticular recess. Neural  foramina are patent. Facet joints are unremarkable.    L5-S1:  No disc herniation or stenosis. Facet joints are unremarkable.      Paraspinous soft tissues:  Very large left renal cortical cyst.      Impression    IMPRESSION:  1. At L4-L5 broad-based disc bulge lateralizes to the right and  appears to contact but not compress the descending right L5 nerve root  in the subarticular recess. Neural foramina are patent.  2. Very large left renal cortical  cyst.    KIRTI MUSA MD       ASSESSMENT/PLAN    ICD-10-CM    1. Lumbar radiculopathy M54.16 XR Lumbar Sacral Transforaminal Inj Right   2. Renal cyst, left N28.1 US Renal Limited     L4-L5 bulging disc with L5 nerve root compression is consistent with her symptoms.  Having been through extensive treatment including physical therapy medications and modified activity, at this point an epidural steroid injection would be the next logical option.  Discussed nature of procedure and she is eager to proceed.  Encouraged to return if she is not finding improvement after 2 weeks.    Incidental finding of left renal cyst.  Ultrasound earlier ordered for further evaluation and follow-up with primary care recommended.

## 2018-10-17 NOTE — LETTER
10/17/2018         RE: Celeste Alcala  [address]  [place] [address]        Dear Colleague,    Thank you for referring your patient, Celeste Alcala, to the  [facility]. Please see a copy of my visit note below.    Musculoskeletal Problem   Associated symptoms include myalgias.      [facility]   Follow-up Visit s Oct 17, 2018    PCP: Alicia Fisher      Subjective:  Celeste is a 58 year old female who is seen in follow up for evaluation of   Chief Complaint   Patient presents with     Right Hip - RECHECK     Her last visit was on 9/24/2018.  Since that time, symptoms have been worse than before. Celeste Alcala is accompanied today by self.       Patient had a lumbar spine MRI since last visit.      Patient has noticed a fluctuating course of symptoms with imaging treatment.    Pain is located right lateral hip, waxing and waning.  Patient is using no aids.    Patient denies any new injuries.    Patient's past medical, surgical, social and family histories are reviewed today.    Patient has noticed improved symptoms with Medrol Dose Pack and physical therapy treatment.  That has plateaued and now having worsening radiation to right posterior lateral thigh and lower leg.    Treatment so far consists of: physical therapy in January 2017 for lower back, chiropractic care, Aleve    Sciatica early 2017, more on RIGHT side, similar RLE pain, no neuropathy.    Social History: works in project management- computer based     Past Medical History:   Diagnosis Date     Unspecified sinusitis (chronic)        Patient Active Problem List    Diagnosis Date Noted     Lumbago 01/05/2017     Priority: Medium     Basal cell carcinoma of auricle of ear 01/30/2013     Priority: Medium     CARDIOVASCULAR SCREENING; LDL GOAL LESS THAN 160 02/10/2010     Priority: Medium     [place] 10-year CHD Risk Score: 1% (5 Total Points)   Values used to calculate score:     Age: 51 years --  "Points: 3     Total Cholesterol: 171 mg/dL -- Points: 3     HDL Cholesterol: 62 mg/dL -- Points: -1     Systolic BP (untreated): 108 mmHg -- Points: 0    The patient is not a smoker. -- Points: 0    The patient has not been diagnosed with diabetes. -- Points: 0    The patient does not have a family history of CHD. -- Points:0           Family History   Problem Relation Age of Onset     Thyroid Disease Mother      graves     Musculoskeletal Disorder Sister      MS     Other Cancer Sister        Social History     Social History     Marital status:      Spouse name: N/A     Number of children: N/A     Years of education: N/A     Occupational History     [employer]      Social History Main Topics     Smoking status: Never Smoker     Smokeless tobacco: Never Used     Alcohol use Yes      Comment: few drinks per night on weekend     Drug use: No       Past Surgical History:   Procedure Laterality Date     BIOPSY      melanoma biopsy     C NONSPECIFIC PROCEDURE      (R) knee surgery-leg length discrepency     C NONSPECIFIC PROCEDURE      T&A     C NONSPECIFIC PROCEDURE       x 2     COLONOSCOPY  2012    Procedure:COLONOSCOPY;  Colonoscopy   ; Surgeon:BRIAN NAGEL; Location: GI     ENT SURGERY      tonsils             Review of Systems   Musculoskeletal: Positive for joint pain and myalgias.   Neurological: Negative for tingling, sensory change and focal weakness.   All other systems reviewed and are negative.        Physical Exam  /77  Ht 5' 7\" (1.702 m)  Wt 144 lb (65.3 kg)  BMI 22.55 kg/m2  Constitutional:well-developed, well-nourished, and in no distress.   Cardiovascular: Intact distal pulses.    Neurological: alert. Gait Normal:   Gait, station, stance, and balance appear normal for age  Skin: Skin is warm and dry.   Psychiatric: Mood and affect normal.   Respiratory: unlabored, speaks in full sentences  Lymph: no LAD, no lymphangitis          Back Exam   Sensation: Normal.  Gait: " Normal.    Tenderness   None    Range of Motion   Flexion:                    Abnormal  Extension:                Normal  Lateral Bend Left:    20  Lateral Bend Right:  20  Rotation Right:         70  Rotation Left:           60  SLR    Right: Negative  Left:    Negative    Muscle Strength   Normal back strength    Comments:  Flexion limited to hands on knees        Recent Results (from the past 744 hour(s))   MR Lumbar Spine w/o Contrast    Narrative    MRI LUMBAR SPINE WITHOUT CONTRAST   10/2/2018 8:04 AM     HISTORY: Nearly 9 months right low back pain radiating down right  thigh, not resolving with meds and physical therapy. Chronic  right-sided low back pain with right-sided sciatica. Adolescent  idiopathic scoliosis of lumbar region.    TECHNIQUE: Multiplanar multisequence MRI of the lumbar spine without  contrast.    COMPARISON: None.    FINDINGS: The report is dictated assuming five lumbar-type vertebral  bodies.  Sagittal images demonstrate normal vertebral body height.  Bone marrow signal is unremarkable. Tip of the conus medullaris and  cauda equina are unremarkable. Mild left convex lumbar scoliosis  centered at the L3 level.    T12-L1:  No disc herniation or stenosis. Facet joints are  unremarkable.    L1-L2:  No disc herniation or stenosis. Facet joints are unremarkable.       L2-L3:  Degenerative disc disease with mild loss of disc space height  and mild disc bulge. No stenosis. Facet joints are unremarkable.    L3-L4:  No disc herniation or stenosis. Facet joints are unremarkable.      L4-L5:  Broad-based disc bulge lateralizes to the right. There is no  central stenosis. There is contact but no compression of the  descending right L5 nerve root in the subarticular recess. Neural  foramina are patent. Facet joints are unremarkable.    L5-S1:  No disc herniation or stenosis. Facet joints are unremarkable.      Paraspinous soft tissues:  Very large left renal cortical cyst.      Impression     IMPRESSION:  1. At L4-L5 broad-based disc bulge lateralizes to the right and  appears to contact but not compress the descending right L5 nerve root  in the subarticular recess. Neural foramina are patent.  2. Very large left renal cortical cyst.    KIRTI MUSA MD       ASSESSMENT/PLAN    ICD-10-CM    1. Lumbar radiculopathy M54.16 XR Lumbar Sacral Transforaminal Inj Right   2. Renal cyst, left N28.1 US Renal Limited     L4-L5 bulging disc with L5 nerve root compression is consistent with her symptoms.  Having been through extensive treatment including physical therapy medications and modified activity, at this point an epidural steroid injection would be the next logical option.  Discussed nature of procedure and she is eager to proceed.  Encouraged to return if she is not finding improvement after 2 weeks.    Incidental finding of left renal cyst.  Ultrasound earlier ordered for further evaluation and follow-up with primary care recommended.    Again, thank you for allowing me to participate in the care of your patient.        Sincerely,        Oc Abdul MD

## 2018-10-24 ENCOUNTER — MYC MEDICAL ADVICE (OUTPATIENT)
Dept: ORTHOPEDICS | Facility: CLINIC | Age: 58
End: 2018-10-24

## 2018-11-07 ENCOUNTER — HOSPITAL ENCOUNTER (OUTPATIENT)
Dept: ULTRASOUND IMAGING | Facility: CLINIC | Age: 58
End: 2018-11-07
Attending: FAMILY MEDICINE
Payer: COMMERCIAL

## 2018-11-07 ENCOUNTER — HOSPITAL ENCOUNTER (OUTPATIENT)
Dept: GENERAL RADIOLOGY | Facility: CLINIC | Age: 58
Discharge: HOME | End: 2018-11-07
Attending: FAMILY MEDICINE | Admitting: FAMILY MEDICINE
Payer: COMMERCIAL

## 2018-11-07 DIAGNOSIS — N28.1 RENAL CYST, LEFT: ICD-10-CM

## 2018-11-07 DIAGNOSIS — M54.16 LUMBAR RADICULOPATHY: ICD-10-CM

## 2018-11-07 PROCEDURE — 25000128 H RX IP 250 OP 636

## 2018-11-07 PROCEDURE — 25500064 ZZH RX 255 OP 636: Performed by: PHYSICIAN ASSISTANT

## 2018-11-07 PROCEDURE — 25000125 ZZHC RX 250

## 2018-11-07 PROCEDURE — 76775 US EXAM ABDO BACK WALL LIM: CPT

## 2018-11-07 PROCEDURE — 64483 NJX AA&/STRD TFRM EPI L/S 1: CPT

## 2018-11-07 RX ORDER — LIDOCAINE HYDROCHLORIDE 10 MG/ML
INJECTION, SOLUTION EPIDURAL; INFILTRATION; INTRACAUDAL; PERINEURAL
Status: COMPLETED
Start: 2018-11-07 | End: 2018-11-07

## 2018-11-07 RX ORDER — DEXAMETHASONE SODIUM PHOSPHATE 10 MG/ML
20 INJECTION, SOLUTION INTRAMUSCULAR; INTRAVENOUS ONCE
Status: COMPLETED | OUTPATIENT
Start: 2018-11-07 | End: 2018-11-07

## 2018-11-07 RX ORDER — DEXAMETHASONE SODIUM PHOSPHATE 10 MG/ML
INJECTION, SOLUTION INTRAMUSCULAR; INTRAVENOUS
Status: COMPLETED
Start: 2018-11-07 | End: 2018-11-07

## 2018-11-07 RX ORDER — IOPAMIDOL 408 MG/ML
10 INJECTION, SOLUTION INTRATHECAL ONCE
Status: COMPLETED | OUTPATIENT
Start: 2018-11-07 | End: 2018-11-07

## 2018-11-07 RX ADMIN — IOPAMIDOL 10 ML: 408 INJECTION, SOLUTION INTRATHECAL at 15:16

## 2018-11-07 RX ADMIN — DEXAMETHASONE SODIUM PHOSPHATE 10 MG: 10 INJECTION, SOLUTION INTRAMUSCULAR; INTRAVENOUS at 15:17

## 2018-11-07 RX ADMIN — LIDOCAINE HYDROCHLORIDE 5 ML: 10 INJECTION, SOLUTION EPIDURAL; INFILTRATION; INTRACAUDAL; PERINEURAL at 15:17

## 2018-11-07 NOTE — PROGRESS NOTES
RADIOLOGY PROCEDURE NOTE  Patient name: Celeste Alcala  MRN: [id number]  : 1960    Pre-procedure diagnosis: Right buttocks and leg pain  Post-procedure diagnosis: Same    Procedure Date/Time: 2018  3:22 PM  Procedure: Right L4-L5 TFESI  Estimated blood loss: None  Specimen(s) collected with description: none  The patient tolerated the procedure well with no immediate complications.  Significant findings:none    See imaging dictation for procedural details.    Provider name: Freddy De La Torre  Assistant(s):None

## 2018-12-04 ENCOUNTER — MYC MEDICAL ADVICE (OUTPATIENT)
Dept: ORTHOPEDICS | Facility: CLINIC | Age: 58
End: 2018-12-04

## 2018-12-04 DIAGNOSIS — M54.16 LUMBAR RADICULOPATHY: Primary | ICD-10-CM

## 2018-12-11 ENCOUNTER — OFFICE VISIT (OUTPATIENT)
Dept: NEUROSURGERY | Facility: CLINIC | Age: 58
End: 2018-12-11
Attending: NURSE PRACTITIONER
Payer: COMMERCIAL

## 2018-12-11 VITALS
BODY MASS INDEX: 22.13 KG/M2 | SYSTOLIC BLOOD PRESSURE: 126 MMHG | HEART RATE: 76 BPM | WEIGHT: 141 LBS | HEIGHT: 67 IN | OXYGEN SATURATION: 99 % | DIASTOLIC BLOOD PRESSURE: 86 MMHG

## 2018-12-11 DIAGNOSIS — M54.16 LUMBAR RADICULAR PAIN: Primary | ICD-10-CM

## 2018-12-11 PROCEDURE — 99204 OFFICE O/P NEW MOD 45 MIN: CPT | Performed by: NURSE PRACTITIONER

## 2018-12-11 PROCEDURE — G0463 HOSPITAL OUTPT CLINIC VISIT: HCPCS

## 2018-12-11 ASSESSMENT — PAIN SCALES - GENERAL: PAINLEVEL: MODERATE PAIN (5)

## 2018-12-11 ASSESSMENT — MIFFLIN-ST. JEOR: SCORE: 1262.2

## 2018-12-11 NOTE — LETTER
2018         RE: Celeste Alcala  [address]  [facility] [address]        Dear Colleague,    Thank you for referring your patient, Celeste Alcala, to the [facility]. Please see a copy of my visit note below.    Dr. Jay Burton  [facility]  Neurosurgery Clinic Visit      CC: low back and right leg pain     Primary care Provider: Alicia Fisher      Reason For Visit:   I was asked by Dr. Fisher to consult on the patient for lumbar radicular pain on the right.      HPI: Celeste Alcala is a 58 year old female with lumbar radicular pain on the right. She notes that the pain began about 9 months ago without incident or injury. She states that she had 1 injection and PT which did not help.  She states that she is able to go to work but this does affect her ability to do exercises. She states that certain exercises makes her pain worse. She states that when the pain was really bad she did note she was dragging her foot but that has resolved. She notes that she has pain to her right lower back and down her right lateral thigh and calf to the ankle.      Pain at its worst 10  Pain right now:  5    Past Medical History:   Diagnosis Date     Unspecified sinusitis (chronic)        Past Medical History reviewed with patient during visit.    Past Surgical History:   Procedure Laterality Date     BIOPSY      melanoma biopsy     C NONSPECIFIC PROCEDURE      (R) knee surgery-leg length discrepency     C NONSPECIFIC PROCEDURE      T&A     C NONSPECIFIC PROCEDURE       x 2     COLONOSCOPY  2012    Procedure:COLONOSCOPY;  Colonoscopy   ; Surgeon:BRIAN NAGEL; Location: GI     ENT SURGERY      tonsils     Past Surgical History reviewed with patient during visit.    Current Outpatient Medications   Medication     CALCIUM CARBONATE PO     cyclobenzaprine (FLEXERIL) 5 MG tablet     fluticasone (FLONASE) 50 MCG/ACT nasal spray     multivitamin,  "therapeutic with minerals (MULTI-VITAMIN) TABS tablet     No current facility-administered medications for this visit.        Allergies   Allergen Reactions     No Known Drug Allergies        Social History     Socioeconomic History     Marital status:      Spouse name: Not on file     Number of children: Not on file     Years of education: Not on file     Highest education level: Not on file   Social Needs     Financial resource strain: Not on file     Food insecurity - worry: Not on file     Food insecurity - inability: Not on file     Transportation needs - medical: Not on file     Transportation needs - non-medical: Not on file   Occupational History     Occupation: [employer]   Tobacco Use     Smoking status: Never Smoker     Smokeless tobacco: Never Used   Substance and Sexual Activity     Alcohol use: Yes     Comment: few drinks per night on weekend     Drug use: No     Sexual activity: Yes     Partners: Male     Birth control/protection: Surgical     Comment: vasectomy   Other Topics Concern     Parent/sibling w/ CABG, MI or angioplasty before 65F 55M? No   Social History Narrative     Not on file       Family History   Problem Relation Age of Onset     Thyroid Disease Mother         graves     Musculoskeletal Disorder Sister         MS     Other Cancer Sister          Review Of Systems  Skin: negative  Eyes: negative  Ears/Nose/Throat: negative  Respiratory: No shortness of breath, dyspnea on exertion, cough, or hemoptysis  Cardiovascular: negative  Gastrointestinal: negative  Genitourinary: negative  Musculoskeletal: back pain  Neurologic: leg pain   Psychiatric: negative  Hematologic/Lymphatic/Immunologic: negative  Endocrine: negative     ROS: 10 point ROS neg other than the symptoms noted above in the HPI.      Vital Signs: /86 (BP Location: Right arm, Patient Position: Sitting, Cuff Size: Adult Regular)   Pulse 76   Ht 5' 7\" (1.702 m)   Wt 141 lb (64 kg)   SpO2 99%   BMI 22.08 kg/m    "       Examination:  Constitutional:  Alert, well nourished, NAD.  Memory: recent and remote memory intact   HEENT: Normocephalic, atraumatic.   Pulm:  Without shortness of breath   CV:  No pitting edema of BLE.    Neurological:  Awake  Alert  Oriented x 3  Speech clear  Cranial nerves II - XII intact  PERRL  EOMI  Face symmetric  Tongue midline  Motor exam   Shoulder Abduction:  Right:  5/5   Left:  5/5  Biceps:                      Right:  5/5   Left:  5/5  Triceps:                     Right:  5/5   Left:  5/5  Wrist Extensors:       Right:  5/5   Left:  5/5  Wrist Flexors:           Right:  5/5   Left:  5/5  Intrinsics:                   Right:  5/5   Left:  5/5   Hip Flexor:                Right: 5/5  Left:  5/5  Hip Adductor:             Right:  5/5  Left:  5/5  Hip Abductor:             Right:  5/5  Left:  5/5  Gastroc Soleus:        Right:  5/5  Left:  5/5  Tib/Ant:                      Right:  5/5  Left:  5/5  EHL:                          Right:  5/5  Left:  5/5   Sensation normal to bilateral upper and lower extremities  Muscle tone to bilateral upper and lower extremities normal   Gait: Able to stand from a seated position. Normal non-antalgic, non-myelopathic gait.  Able to heel/toe walk without loss of balance  Lumbar examination reveals no tenderness of the spine or paraspinous muscles.  Hip height is symmetrical. Negative SI joint, sciatic notch or greater trochanteric tenderness to palpation bilaterally.  Straight leg raise is positive on the right.        Imaging:     IMPRESSION:  1. At L4-L5 broad-based disc bulge lateralizes to the right and  appears to contact but not compress the descending right L5 nerve root  in the subarticular recess. Neural foramina are patent.  2. Very large left renal cortical cyst.        Assessment/Plan:   Celeste Alcala is a 58 year old female with lumbar radicular pain on the right. She notes that the pain began about 9 months ago without incident or injury. She  states that she had 1 injection and PT which did not help.  She states that she is able to go to work but this does affect her ability to do exercises. She states that certain exercises makes her pain worse. She states that when the pain was really bad she did note she was dragging her foot but that has resolved. She notes that she has pain to her right lower back and down her right lateral thigh and calf to the ankle.  The pt is neurologically intact with positive straight leg raise on the right. She does have a right L4-5 disk herniation that correlates with her pain pattern.  She did appear to have an injection at the S1 level. It was explained that I would recommend an injection at L4-5. She is open to this. She was educated that this can be repeat in a series of 3, 2 weeks apart. Max 4 per year.         Patient Instructions   1. Please schedule your injection. Someone will contact you from the pain clinic within 24 hours to schedule.      2. Please contact the clinic if pain persists at [phone]. We may repeat injection in 2 weeks.        Dannielle Grove CNP  [facility]  [address] [address]    Tel [phone]  Pager [phone]      Again, thank you for allowing me to participate in the care of your patient.        Sincerely,        MARCO A Rodriguez CNP

## 2018-12-11 NOTE — PATIENT INSTRUCTIONS
1. Please schedule your injection. Someone will contact you from the pain clinic within 24 hours to schedule.      2. Please contact the clinic if pain persists at [phone]. We may repeat injection in 2 weeks.

## 2018-12-11 NOTE — NURSING NOTE
"Celeste Alcala is a 58 year old female who presents for:  Chief Complaint   Patient presents with     Neurologic Problem     Lumbar radiculopathy, right side leg pain with numbness in the right hip         Vitals:    Vitals:    12/11/18 1324   BP: 126/86   BP Location: Right arm   Patient Position: Sitting   Cuff Size: Adult Regular   Pulse: 76   SpO2: 99%   Weight: 141 lb (64 kg)   Height: 5' 7\" (1.702 m)       BMI:  Estimated body mass index is 22.08 kg/m  as calculated from the following:    Height as of this encounter: 5' 7\" (1.702 m).    Weight as of this encounter: 141 lb (64 kg).    Pain Score:  Moderate Pain (5)      Do you feel safe in your environment?  Yes      Cyndee Lo          "

## 2018-12-11 NOTE — PROGRESS NOTES
Dr. Jay Burton  [facility]  Neurosurgery Clinic Visit      CC: low back and right leg pain     Primary care Provider: Alicia Fisher      Reason For Visit:   I was asked by Dr. Fisher to consult on the patient for lumbar radicular pain on the right.      HPI: Celeste Alcala is a 58 year old female with lumbar radicular pain on the right. She notes that the pain began about 9 months ago without incident or injury. She states that she had 1 injection and PT which did not help.  She states that she is able to go to work but this does affect her ability to do exercises. She states that certain exercises makes her pain worse. She states that when the pain was really bad she did note she was dragging her foot but that has resolved. She notes that she has pain to her right lower back and down her right lateral thigh and calf to the ankle.      Pain at its worst 10  Pain right now:  5    Past Medical History:   Diagnosis Date     Unspecified sinusitis (chronic)        Past Medical History reviewed with patient during visit.    Past Surgical History:   Procedure Laterality Date     BIOPSY      melanoma biopsy     C NONSPECIFIC PROCEDURE      (R) knee surgery-leg length discrepency     C NONSPECIFIC PROCEDURE      T&A     C NONSPECIFIC PROCEDURE       x 2     COLONOSCOPY  2012    Procedure:COLONOSCOPY;  Colonoscopy   ; Surgeon:BRIAN NAGEL; Location: GI     ENT SURGERY      tonsils     Past Surgical History reviewed with patient during visit.    Current Outpatient Medications   Medication     CALCIUM CARBONATE PO     cyclobenzaprine (FLEXERIL) 5 MG tablet     fluticasone (FLONASE) 50 MCG/ACT nasal spray     multivitamin, therapeutic with minerals (MULTI-VITAMIN) TABS tablet     No current facility-administered medications for this visit.        Allergies   Allergen Reactions     No Known Drug Allergies        Social History     Socioeconomic History     Marital status:   "    Spouse name: Not on file     Number of children: Not on file     Years of education: Not on file     Highest education level: Not on file   Social Needs     Financial resource strain: Not on file     Food insecurity - worry: Not on file     Food insecurity - inability: Not on file     Transportation needs - medical: Not on file     Transportation needs - non-medical: Not on file   Occupational History     Occupation: [employer]   Tobacco Use     Smoking status: Never Smoker     Smokeless tobacco: Never Used   Substance and Sexual Activity     Alcohol use: Yes     Comment: few drinks per night on weekend     Drug use: No     Sexual activity: Yes     Partners: Male     Birth control/protection: Surgical     Comment: vasectomy   Other Topics Concern     Parent/sibling w/ CABG, MI or angioplasty before 65F 55M? No   Social History Narrative     Not on file       Family History   Problem Relation Age of Onset     Thyroid Disease Mother         graves     Musculoskeletal Disorder Sister         MS     Other Cancer Sister          Review Of Systems  Skin: negative  Eyes: negative  Ears/Nose/Throat: negative  Respiratory: No shortness of breath, dyspnea on exertion, cough, or hemoptysis  Cardiovascular: negative  Gastrointestinal: negative  Genitourinary: negative  Musculoskeletal: back pain  Neurologic: leg pain   Psychiatric: negative  Hematologic/Lymphatic/Immunologic: negative  Endocrine: negative     ROS: 10 point ROS neg other than the symptoms noted above in the HPI.      Vital Signs: /86 (BP Location: Right arm, Patient Position: Sitting, Cuff Size: Adult Regular)   Pulse 76   Ht 5' 7\" (1.702 m)   Wt 141 lb (64 kg)   SpO2 99%   BMI 22.08 kg/m         Examination:  Constitutional:  Alert, well nourished, NAD.  Memory: recent and remote memory intact   HEENT: Normocephalic, atraumatic.   Pulm:  Without shortness of breath   CV:  No pitting edema of BLE.    Neurological:  Awake  Alert  Oriented x " 3  Speech clear  Cranial nerves II - XII intact  PERRL  EOMI  Face symmetric  Tongue midline  Motor exam   Shoulder Abduction:  Right:  5/5   Left:  5/5  Biceps:                      Right:  5/5   Left:  5/5  Triceps:                     Right:  5/5   Left:  5/5  Wrist Extensors:       Right:  5/5   Left:  5/5  Wrist Flexors:           Right:  5/5   Left:  5/5  Intrinsics:                   Right:  5/5   Left:  5/5   Hip Flexor:                Right: 5/5  Left:  5/5  Hip Adductor:             Right:  5/5  Left:  5/5  Hip Abductor:             Right:  5/5  Left:  5/5  Gastroc Soleus:        Right:  5/5  Left:  5/5  Tib/Ant:                      Right:  5/5  Left:  5/5  EHL:                          Right:  5/5  Left:  5/5   Sensation normal to bilateral upper and lower extremities  Muscle tone to bilateral upper and lower extremities normal   Gait: Able to stand from a seated position. Normal non-antalgic, non-myelopathic gait.  Able to heel/toe walk without loss of balance  Lumbar examination reveals no tenderness of the spine or paraspinous muscles.  Hip height is symmetrical. Negative SI joint, sciatic notch or greater trochanteric tenderness to palpation bilaterally.  Straight leg raise is positive on the right.        Imaging:     IMPRESSION:  1. At L4-L5 broad-based disc bulge lateralizes to the right and  appears to contact but not compress the descending right L5 nerve root  in the subarticular recess. Neural foramina are patent.  2. Very large left renal cortical cyst.        Assessment/Plan:   Celeste Alcala is a 58 year old female with lumbar radicular pain on the right. She notes that the pain began about 9 months ago without incident or injury. She states that she had 1 injection and PT which did not help.  She states that she is able to go to work but this does affect her ability to do exercises. She states that certain exercises makes her pain worse. She states that when the pain was really bad she  did note she was dragging her foot but that has resolved. She notes that she has pain to her right lower back and down her right lateral thigh and calf to the ankle.  The pt is neurologically intact with positive straight leg raise on the right. She does have a right L4-5 disk herniation that correlates with her pain pattern.  She did appear to have an injection at the S1 level. It was explained that I would recommend an injection at L4-5. She is open to this. She was educated that this can be repeat in a series of 3, 2 weeks apart. Max 4 per year.         Patient Instructions   1. Please schedule your injection. Someone will contact you from the pain clinic within 24 hours to schedule.      2. Please contact the clinic if pain persists at [phone]. We may repeat injection in 2 weeks.        Dannielle Grove [facility]  [facility]ine [facility]  [address] [address]    Tel [phone]  Pager [phone]

## 2018-12-12 ENCOUNTER — TELEPHONE (OUTPATIENT)
Dept: PALLIATIVE MEDICINE | Facility: CLINIC | Age: 58
End: 2018-12-12

## 2018-12-12 NOTE — TELEPHONE ENCOUNTER
TRACY for patient to schedule Lumbar WILLIAM      Keiko Guevara    [facility]

## 2018-12-12 NOTE — TELEPHONE ENCOUNTER
Pre-screening Questions for Radiology Injections:    Injection to be done at which interventional clinic site? [facility]    Instruct patient to arrive as directed prior to the scheduled appointment time:    [place] AND [place]: 30 minutes before      Procedure ordered by Dannielle Grove    Procedure ordered? Lumbar Epidural Steroid Injection    What insurance would patient like us to bill for this procedure? BCBS      Worker's comp or MVA (motor vehicle accident) -Any injection DO NOT SCHEDULE and route to Shonna Eliezer.      [employer] - For SI joint injections, DO NOT SCHEDULE and route Shonna Martins. [employer] NO PA REQUIRED EFFECTIVE 11/1/2017      [employer]- MBB's must be scheduled at LEAST two weeks apart      [employer] - Any injection besides hip/shoulder/knee joint DO NOT SCHEDULE and route to Shonna Eliezer. She will obtain PA and call pt back to schedule procedure or notify pt of denial.       [employer]-Route to Shonna for review    Any chance of pregnancy? NO   If YES, do NOT schedule and route to RN pool    Is an  needed? No     Patient has a drive home? (mandatory) YES: INFORMED    Is patient taking any blood thinners (plavix, coumadin, jantoven, warfarin, heparin, pradaxa or dabigatran )? No   If hold needed, do NOT schedule, route to RN pool     Is patient taking any aspirin products (includes Excedrin and Fiorinal)? No     If more than 325mg/day do NOT schedule; route to RN pool     For CERVICAL procedures, hold all aspirin products for 6 days.     Tell pt that if aspirin product is not held for 6 days, the procedure WILL BE cancelled.      Does the patient have a bleeding or clotting disorder? No     If YES, okay to schedule AND route to RN nurse pool    For any patients with platelet count <100, must be forwarded to provider    Is patient diabetic?  No  If YES, have them bring their glucometer.    Does patient have an active infection or treated for  one within the past week? No     Is patient currently taking any antibiotics?  No     For patients on chronic, preventative, or prophylactic antibiotics, procedures may be scheduled.     For patients on antibiotics for active or recent infection:    Camilla Bacon Burton, [place]-antibiotic course must have been completed for 4 days    Is patient currently taking any steroid medications? (i.e. Prednisone, Medrol)  No     For patients on steroid medications:    Camilla Bacon Burton, [place]-steroid course must have been completed for 4 days    Reviewed with patient:  If you are started on any steroids or antibiotics between now and your appointment, you must contact us because the procedure may need to be cancelled.  Yes    Is patient actively being treated for cancer or immunocompromised? No  If YES, do NOT schedule and route to RN pool     Are you able to get on and off an exam table with minimal or no assistance? Yes  If NO, do NOT schedule and route to RN pool    Are you able to roll over and lay on your stomach with minimal or no assistance? Yes  If NO, do NOT schedule and route to RN pool     Any allergies to contrast dye, iodine, shellfish, or numbing and steroid medications? No  If YES, route to RN pool AND add allergy information to appointment notes    Allergies: No known drug allergies      Has the patient had a flu shot or any other vaccinations within 7 days before or after the procedure.  No     Does patient have an MRI/CT?  YES: MRI  (SI joint, hip injections, lumbar sympathetic blocks, and stellate ganglion blocks do not require an MRI)    Was the MRI done w/in the last 3 years?  Yes    Was MRI done at [place]? Yes      If not, where was it done? N/A       If MRI was not done at [facility], [facility] or [facility] do NOT schedule and route to nursing.  If pt has an imaging disc, the injection may be scheduled but pt has to bring disc to appt. If they show up w/out disc the  injection cannot be done    Reminders (please tell patient if applicable):       Instructed pt to arrive 30 minutes early for IV start if this is for a cervical procedure, ALL sympathetic (stellate ganglion, hypogastric, or lumbar sympathetic block) and all sedation procedures (RFA, spinal cord stimulation trials).  Not Applicable   -IVs are not routinely placed for Dr. Jalloh cervical cases   -Dr. Lacey: IVs for cervical ESIs and cervical TBDs (not CMBBs/facet inj)      If NPO for sedation, informed patient that it is okay to take medications with sips of water (except if they are to hold blood thinners).  Not Applicable   *DO take blood pressure medication if it is prescribed*      If this is for a cervical WILLIAM, informed patient that aspirin needs to be held for 6 days.   Not Applicable      For all patients not having spinal cord stimulator (SCS) trials or radiofrequency ablations (RFAs), informed patient:    IV sedation is not provided for this procedure.  If you feel that an oral anti-anxiety medication is needed, you can discuss this further with your referring provider or primary care provider.  The Pain Clinic provider will discuss specifics of what the procedure includes at your appointment.  Most procedures last 10-20 minutes.  We use numbing medications to help with any discomfort during the procedure.  Not Applicable      Do not schedule procedures requiring IV placement in the first appointment of the day or first appointment after lunch. Do NOT schedule at 0745, 0815 or 1245. N/A      For patients 85 or older we recommend having an adult stay w/ them for the remainder of the day.   N/A    Does the patient have any questions?  NO  Keiko Guevara  [facility]

## 2018-12-18 NOTE — PROGRESS NOTES
[facility] - Procedure Note    Date of Service: 12/19/2018    Procedure performed: Right L4-5 transforaminal epidural steroid injection with fluoroscopic guidance  Diagnosis: Lumbar spondylosis; Lumbar radiculitis/radiculopathy  : Latonya Jalloh MD & Moy De Jesus DO (pain fellow)   Anesthesia: none    Indications: Celeste Alcala is a 58 year old female who is seen at the request of Dannielle Grove CNP for lumbar transforaminal epidural steroid injection. The patient describes pain in the right leg that travels down to her ankle. The patient has been exhibiting symptoms consistent with lumbar intraspinal inflammation and radiculopathy. Symptoms have been persistent, disabling, and intermittently severe. The patient reports minimal improvement with conservative treatment, including medications and therapy.    Lumbar MRI was done on 10/02/2018 which showed   FINDINGS: The report is dictated assuming five lumbar-type vertebral  bodies.  Sagittal images demonstrate normal vertebral body height.  Bone marrow signal is unremarkable. Tip of the conus medullaris and  cauda equina are unremarkable. Mild left convex lumbar scoliosis  centered at the L3 level.     T12-L1:  No disc herniation or stenosis. Facet joints are  unremarkable.     L1-L2:  No disc herniation or stenosis. Facet joints are unremarkable.      L2-L3:  Degenerative disc disease with mild loss of disc space height  and mild disc bulge. No stenosis. Facet joints are unremarkable.     L3-L4:  No disc herniation or stenosis. Facet joints are unremarkable.      L4-L5:  Broad-based disc bulge lateralizes to the right. There is no  central stenosis. There is contact but no compression of the  descending right L5 nerve root in the subarticular recess. Neural  foramina are patent. Facet joints are unremarkable.     L5-S1:  No disc herniation or stenosis. Facet joints are unremarkable.      Paraspinous soft tissues:  Very large left renal  cortical cyst.                                                                    IMPRESSION:  1. At L4-L5 broad-based disc bulge lateralizes to the right and  appears to contact but not compress the descending right L5 nerve root  in the subarticular recess. Neural foramina are patent.  2. Very large left renal cortical cyst.    Allergies:      Allergies   Allergen Reactions     No Known Drug Allergies         Vitals:  /85   Pulse 65   SpO2 100%     Review of Systems: The patient denies recent fever, chills, illness, use of antibiotics or anticoagulants. All other 10-point review of systems negative.     Procedure: The procedure and risks were explained, and informed written consent was obtained from the patient. Risks include but are not limited to: infection, bleeding, increased pain, and damage to soft tissue, nerve, muscle, and vasculature structures. After getting informed consent, patient was brought into the procedure suite and was placed in a prone position on the procedure table. A Pause for the Cause was performed. Patient was prepped and draped in sterile fashion.     After identifying the right L4-5 neuroforamen, the C-arm was rotated to a right lateral oblique angle.  A total of 4.5 mL of Lidocaine 1% was used to anesthetize the skin and the needle track at a skin entry site coaxial with the fluoroscopy beam, and overriding the superior aspect of the neuroforamen.  A 22 gauge 5 inch spinal needle was advanced under intermittent fluoroscopy until it entered the foramen superiorly.    The position was then inspected from anteroposterior and lateral views, and the needle adjusted appropriately.  After negative aspiration, a total of 1 mL of Omnipaque-300 was injected using static and continuous fluoroscopy confirming appropriate position, with spread along the nerve root sheath and into the epidural space, with no intravascular or intrathecal uptake. 9 mL of Omnipaque-300 was wasted.    2mL of 1%  lidocaine with 20 mg of dexamethasone was injected.  The needle was removed. Hemostasis was achieved, the area was cleaned, and bandaids were placed when appropriate. Images were saved to PACS.    The patient tolerated the procedure well, and was taken to the recovery room, and there was no evidence of procedural complications. No new sensory or motor deficits were noted following the procedure. The patient was stable and able to ambulate on discharge home. Post-procedure instructions were provided.     Pre-procedure pain score: 5/10 in the back, 5/10 in the leg  Post-procedure pain score: 0/10 in the back, 0/10 in the leg    Assessment/Plan: Celeste Alcala is a 58 year old female s/p Right L4-5 transforaminal epidural steroid injection today for lumbar spondylosis, radiculitis/radiculopathy.     1. Following today's procedure, the patient was advised to contact the [facility] for any of the following:   Fever, chills, or night sweats   New onset of pain, numbness, or weakness   Any questions/concerns regarding the procedure  If unable to contact the Pain Center, the patient was instructed to go to a local Emergency Room for any complications.   2. The patient will receive a follow-up call in 1 week.  3. The patient should follow-up with the referring provider in 2 weeks for post-procedure evaluation.      Latonya Jalloh MD   [facility]

## 2018-12-19 ENCOUNTER — RADIOLOGY INJECTION OFFICE VISIT (OUTPATIENT)
Dept: PALLIATIVE MEDICINE | Facility: CLINIC | Age: 58
End: 2018-12-19
Payer: COMMERCIAL

## 2018-12-19 ENCOUNTER — ANCILLARY PROCEDURE (OUTPATIENT)
Dept: GENERAL RADIOLOGY | Facility: CLINIC | Age: 58
End: 2018-12-19
Attending: ANESTHESIOLOGY
Payer: COMMERCIAL

## 2018-12-19 VITALS — SYSTOLIC BLOOD PRESSURE: 113 MMHG | HEART RATE: 65 BPM | DIASTOLIC BLOOD PRESSURE: 85 MMHG | OXYGEN SATURATION: 100 %

## 2018-12-19 DIAGNOSIS — M54.16 LUMBAR RADICULOPATHY: Primary | ICD-10-CM

## 2018-12-19 DIAGNOSIS — M54.16 LUMBAR RADICULAR PAIN: ICD-10-CM

## 2018-12-19 PROCEDURE — 64483 NJX AA&/STRD TFRM EPI L/S 1: CPT | Mod: RT | Performed by: ANESTHESIOLOGY

## 2018-12-19 NOTE — NURSING NOTE
Pre-procedure Intake    Have you been fasting? NA    If yes, for how long?     Are you taking a prescribed blood thinner such as coumadin, Plavix, Xarelto?    No    If yes, when did you take your last dose?     Do you take aspirin?  No    If cervical procedure, have you held aspirin for 6 days?   NA    Do you have any allergies to contrast dye, iodine, steroid and/or numbing medications?  NO    Are you currently taking antibiotics or have an active infection?  NO    Have you had a fever/elevated temperature within the past week? NO    Are you currently taking oral steroids? NO    Do you have a ? Yes       Are you pregnant or breastfeeding?  NO    Are the vital signs normal?  Yes    Rema MORA-RN Care Coordinator  [facility]

## 2018-12-19 NOTE — NURSING NOTE
Discharge Information    IV Discontiued Time:  NA    Amount of Fluid Infused:  NA    Discharge Criteria = When patient returns to baseline or as per MD order    Consciousness:  Pt is fully awake    Circulation:  BP +/- 20% of pre-procedure level    Respiration:  Patient is able to breathe deeply    O2 Sat:  Patient is able to maintain O2 Sat >92% on room air    Activity:  Moves 4 extremities on command    Ambulation:  Patient is able to stand and walk or stand and pivot into wheelchair    Dressing:  Clean/dry or No Dressing    Notes:   Discharge instructions and AVS given to patient    Patient meets criteria for discharge?  YES    Admitted to PCU?  No    Responsible adult present to accompany patient home?  Yes    Signature/Title:    Rema Vagn RN Care Coordinator  [facility]

## 2018-12-19 NOTE — PATIENT INSTRUCTIONS
[facility] Procedure Discharge Instructions    Today you saw:   Dr. Latonya Jalloh   Your procedure: Lumbar Epidural steroid injection     Medications used:  Lidocaine (anesthetic)  Dexamethasone (steroid)  Omnipaque (contrast)                Be cautious when walking as numbness and/or weakness in the legs may occur up to 6-8 hours after the procedure due to effect of the local anesthetic    Do not drive for 6 hours. The effect of the local anesthetic could slow your reflexes.     Avoid strenuous activity for the first 24 hours. You may resume your regular activities after that.     You may shower, however avoid swimming, tub baths or hot tubs for 24 hours following your procedure    You may have a mild to moderate increase in pain for several days following the injection.      You may use ice packs for 10-15 minutes, 3 to 4 times a day at the injection site for comfort    Do not use heat to painful areas for 6 to 8 hours. This will give the local anesthetic time to wear off and prevent you from accidentally burning your skin.    You may use anti-inflammatory medications (such as Ibuprofen/Advil or Aleve) or Tylenol for pain control if necessary    With diabetes, check your blood sugar more frequently than usual as your blood sugar may be higher than normal for 10-14 days following a steroid injection. Contact your doctor who manages your diabetes if your blood sugar is higher than usual    It may take up to 14 days for the steroid medication to start working although you may feel the effect as early as a few days after the procedure.     Follow up with your referring provider in 2-3 weeks      If you experience any of the following, call the pain center line during work hours at [phone] or on-call physician after hours at [phone]:  -Fever over 100 degree F  -Swelling, bleeding, redness, drainage, warmth at the injection site  -Progressive weakness or numbness in your legs   -Loss of bowel or  bladder function  -Unusual headache that is not relieved by Tylenol or your regular headache medication  -Unusual new onset of pain that is not improving

## 2019-01-11 ENCOUNTER — MYC MEDICAL ADVICE (OUTPATIENT)
Dept: NEUROSURGERY | Facility: CLINIC | Age: 59
End: 2019-01-11

## 2019-01-11 DIAGNOSIS — M54.16 LUMBAR RADICULOPATHY: Primary | ICD-10-CM

## 2019-01-17 NOTE — TELEPHONE ENCOUNTER
Spoke to patient . She would like to proceed with EMG. She reports right leg pain. Patient requesting [place] location. Order sent to [facility]. Provided number to patient as well if she does not hear back to schedule.     Faxed EMG order faxed to [facility]  January 17, 2019 to fax number [phone].    Right Fax confirmed at 5:20 PM    Aminata Barakat RN

## 2019-01-25 ENCOUNTER — TRANSFERRED RECORDS (OUTPATIENT)
Dept: HEALTH INFORMATION MANAGEMENT | Facility: CLINIC | Age: 59
End: 2019-01-25

## 2019-02-13 PROBLEM — M54.50 LUMBAGO: Status: RESOLVED | Noted: 2017-01-05 | Resolved: 2019-02-13

## 2019-02-13 NOTE — PROGRESS NOTES
Discharge Note    Progress reporting period is from initial eval/last PN to Jun 20, 2018.     Celeste failed to return and current status is unknown.  Please see information below for last relevant information on current status.  Patient seen for 3 visits.  SUBJECTIVE  Subjective changes noted by patient:  Pt is doing HEP 3x/day typically.  Pt does tend to notice that if she does a lot of the HEP that it does seem to help.  Pt is able to sleep through the night now.    .  Current pain level is 3/10.     Previous pain level was  7/10.   Changes in function:  Yes (See Goal flowsheet attached for changes in current functional level)  Adverse reaction to treatment or activity: None    OBJECTIVE  Changes noted in objective findings: TROM: flex min loss, ext min loss, SG R and L wnl.     ASSESSMENT/PLAN  Diagnosis: Right LBP   DIAGP:  The encounter diagnosis was Lumbago.  STG/LTGs have been met or progress has been made towards goals:  Yes, please see goal flowsheet for most current information  Assessment of Progress: current status is unknown.    Last current status: Pt is progressing as expected   Self Management Plans:  HEP  I have re-evaluated this patient and find that the nature, scope, duration and intensity of the therapy is appropriate for the medical condition of the patient.  Celeste continues to require the following intervention to meet STG and LTG's:  HEP.    Recommendations:  Discharge with current home program.  Patient to follow up with MD as needed.    Please refer to the daily flowsheet for treatment today, total treatment time and time spent performing 1:1 timed codes.

## 2019-06-11 ENCOUNTER — MYC MEDICAL ADVICE (OUTPATIENT)
Dept: PEDIATRICS | Facility: CLINIC | Age: 59
End: 2019-06-11

## 2019-06-11 DIAGNOSIS — R09.81 NASAL CONGESTION: ICD-10-CM

## 2019-06-12 RX ORDER — FLUTICASONE PROPIONATE 50 MCG
1-2 SPRAY, SUSPENSION (ML) NASAL DAILY
Qty: 16 G | Refills: 0 | Status: SHIPPED | OUTPATIENT
Start: 2019-06-12 | End: 2019-07-16

## 2019-06-12 RX ORDER — OXYMETAZOLINE HYDROCHLORIDE 0.05 G/100ML
2 SPRAY NASAL 2 TIMES DAILY
Qty: 15 ML | Refills: 0 | Status: CANCELLED | OUTPATIENT
Start: 2019-06-12

## 2019-06-12 NOTE — TELEPHONE ENCOUNTER
Please review and advise on [employer] message.       Routing refill request to provider for review/approval because:  Drug not active on patient's medication list  A break in medication  Patient needs to be seen for annual exam.     Regina Daley RN Flex

## 2019-07-12 ENCOUNTER — MYC MEDICAL ADVICE (OUTPATIENT)
Dept: PEDIATRICS | Facility: CLINIC | Age: 59
End: 2019-07-12

## 2019-07-12 ENCOUNTER — TELEPHONE (OUTPATIENT)
Dept: PEDIATRICS | Facility: CLINIC | Age: 59
End: 2019-07-12

## 2019-07-12 DIAGNOSIS — H93.8X3 CONGESTION OF BOTH EARS: Primary | ICD-10-CM

## 2019-07-12 NOTE — TELEPHONE ENCOUNTER
Patient called to request a referral to ENT for ongoing vertigo, feelings of ear congestion.     She is concerned about not being able to be seen at ENT prior to her daughter's wedding in August. She is willing to call around to multiple clinics/travel for soonest appointment.     Referral cued for MD review, if okay at this time.    Kylah Hager RN BSN   [facility]

## 2019-07-15 DIAGNOSIS — R09.81 NASAL CONGESTION: ICD-10-CM

## 2019-07-15 NOTE — TELEPHONE ENCOUNTER
"Requested Prescriptions   Pending Prescriptions Disp Refills     fluticasone (FLONASE) 50 MCG/ACT nasal spray  Last Written Prescription Date:  6/12/19  Last Fill Quantity: 16 g,  # refills: 0   Last office visit: 4/3/2018 with prescribing provider:  Alicia Fisher MD   Future Office Visit:   Next 5 appointments (look out 90 days)    Jul 23, 2019  6:10 PM CDT  Adult preventative visit with Alicia Fisher MD  [facility] ([facility] [address] [place] [id number] [phone]          16 g 0     Sig: Spray 1-2 sprays into both nostrils daily       Inhaled Steroids Protocol Passed - 7/15/2019  1:42 PM        Passed - Patient is age 12 or older        Passed - Recent (12 mo) or future (30 days) visit within the authorizing provider's specialty     Patient had office visit in the last 12 months or has a visit in the next 30 days with authorizing provider or within the authorizing provider's specialty.  See \"Patient Info\" tab in inbasket, or \"Choose Columns\" in Meds & Orders section of the refill encounter.              Passed - Medication is active on med list          "

## 2019-07-16 RX ORDER — FLUTICASONE PROPIONATE 50 MCG
1-2 SPRAY, SUSPENSION (ML) NASAL DAILY
Qty: 16 G | Refills: 0 | Status: SHIPPED | OUTPATIENT
Start: 2019-07-16 | End: 2019-07-23

## 2019-07-16 NOTE — TELEPHONE ENCOUNTER
Pending Prescriptions:                       Disp   Refills    fluticasone (FLONASE) 50 MCG/ACT nasal sp*16 g   0            Sig: Spray 1-2 sprays into both nostrils daily    Prescription approved per [facility] Refill Protocol.    Ileana Ramires, RN, BSN

## 2019-07-22 ENCOUNTER — TRANSFERRED RECORDS (OUTPATIENT)
Dept: HEALTH INFORMATION MANAGEMENT | Facility: CLINIC | Age: 59
End: 2019-07-22

## 2019-07-22 ASSESSMENT — ENCOUNTER SYMPTOMS
MYALGIAS: 0
CONSTIPATION: 0
NAUSEA: 1
HEMATOCHEZIA: 0
DYSURIA: 0
ABDOMINAL PAIN: 0
PALPITATIONS: 0
HEADACHES: 0
NERVOUS/ANXIOUS: 0
PARESTHESIAS: 0
DIARRHEA: 0
COUGH: 0
CHILLS: 0
ARTHRALGIAS: 0
EYE PAIN: 0
SORE THROAT: 0
BREAST MASS: 0
JOINT SWELLING: 0
HEMATURIA: 0
FEVER: 0
FREQUENCY: 0
HEARTBURN: 0
WEAKNESS: 0
SHORTNESS OF BREATH: 0
DIZZINESS: 1

## 2019-07-23 ENCOUNTER — OFFICE VISIT (OUTPATIENT)
Dept: PEDIATRICS | Facility: CLINIC | Age: 59
End: 2019-07-23
Payer: COMMERCIAL

## 2019-07-23 VITALS
HEIGHT: 67 IN | WEIGHT: 138.9 LBS | HEART RATE: 62 BPM | SYSTOLIC BLOOD PRESSURE: 126 MMHG | TEMPERATURE: 98.2 F | BODY MASS INDEX: 21.8 KG/M2 | DIASTOLIC BLOOD PRESSURE: 80 MMHG | OXYGEN SATURATION: 99 %

## 2019-07-23 DIAGNOSIS — Z11.59 NEED FOR HEPATITIS C SCREENING TEST: ICD-10-CM

## 2019-07-23 DIAGNOSIS — Z00.00 ROUTINE GENERAL MEDICAL EXAMINATION AT A HEALTH CARE FACILITY: Primary | ICD-10-CM

## 2019-07-23 DIAGNOSIS — Z13.220 SCREENING FOR HYPERLIPIDEMIA: ICD-10-CM

## 2019-07-23 DIAGNOSIS — R09.81 NASAL CONGESTION: ICD-10-CM

## 2019-07-23 DIAGNOSIS — R42 VERTIGO: ICD-10-CM

## 2019-07-23 DIAGNOSIS — Z13.1 SCREENING FOR DIABETES MELLITUS: ICD-10-CM

## 2019-07-23 PROCEDURE — 90471 IMMUNIZATION ADMIN: CPT | Performed by: INTERNAL MEDICINE

## 2019-07-23 PROCEDURE — 90715 TDAP VACCINE 7 YRS/> IM: CPT | Performed by: INTERNAL MEDICINE

## 2019-07-23 PROCEDURE — 99396 PREV VISIT EST AGE 40-64: CPT | Mod: 25 | Performed by: INTERNAL MEDICINE

## 2019-07-23 RX ORDER — FLUTICASONE PROPIONATE 50 MCG
1-2 SPRAY, SUSPENSION (ML) NASAL DAILY
Qty: 16 G | Refills: 11 | Status: SHIPPED | OUTPATIENT
Start: 2019-07-23 | End: 2022-04-11

## 2019-07-23 RX ORDER — ONDANSETRON 4 MG/1
4-8 TABLET, ORALLY DISINTEGRATING ORAL EVERY 8 HOURS PRN
Qty: 10 TABLET | Refills: 1 | Status: SHIPPED | OUTPATIENT
Start: 2019-07-23 | End: 2022-04-11

## 2019-07-23 ASSESSMENT — ENCOUNTER SYMPTOMS
NERVOUS/ANXIOUS: 0
EYE PAIN: 0
JOINT SWELLING: 0
ABDOMINAL PAIN: 0
HEMATOCHEZIA: 0
NAUSEA: 1
HEARTBURN: 0
PALPITATIONS: 0
DIZZINESS: 1
COUGH: 0
DYSURIA: 0
PARESTHESIAS: 0
CHILLS: 0
FEVER: 0
FREQUENCY: 0
CONSTIPATION: 0
WEAKNESS: 0
HEMATURIA: 0
SORE THROAT: 0
ARTHRALGIAS: 0
SHORTNESS OF BREATH: 0
BREAST MASS: 0
MYALGIAS: 0
HEADACHES: 0
DIARRHEA: 0

## 2019-07-23 ASSESSMENT — MIFFLIN-ST. JEOR: SCORE: 1247.68

## 2019-07-23 NOTE — PATIENT INSTRUCTIONS
Preventive Health Recommendations  Female Ages 50 - 64    Yearly exam: See your health care provider every year in order to  o Review health changes.   o Discuss preventive care.    o Review your medicines if your doctor has prescribed any.      Get a Pap test every three years (unless you have an abnormal result and your provider advises testing more often).    If you get Pap tests with HPV test, you only need to test every 5 years, unless you have an abnormal result.       Have a mammogram every 1 to 2 years.  You are due in February and will get a mychart reminder.    Have a colonoscopy at age 50, or have a yearly FIT test (stool test). These exams screen for colon cancer.      Have a cholesterol test every 5 years, or more often if advised.    Have a diabetes test (fasting glucose) every three years. If you are at risk for diabetes, you should have this test more often.       Shots: Get a flu shot each year. Get a tetanus shot every 10 years.    Nutrition:     Eat at least 5 servings of fruits and vegetables each day.    Eat whole-grain bread, whole-wheat pasta and brown rice instead of white grains and rice.    Get adequate Calcium and Vitamin D.     Lifestyle    Exercise at least 150 minutes a week (30 minutes a day, 5 days a week). This will help you control your weight and prevent disease.    Limit alcohol to one drink per day.    No smoking.     Wear sunscreen to prevent skin cancer.     See your dentist every six months for an exam and cleaning.    See your eye doctor every 1 to 2 years.     I recommend stopping the calcium and multivitamin - there is no evidence it is good for.    We will get the labs to Dr. Burgos after they are back.  A diuretic can be very helpful.

## 2019-07-23 NOTE — PROGRESS NOTES
SUBJECTIVE:   CC: Celeste Alcala is an 59 year old woman who presents for preventive health visit.     Healthy Habits:     Getting at least 3 servings of Calcium per day:  Yes    Bi-annual eye exam:  Yes    Dental care twice a year:  Yes    Sleep apnea or symptoms of sleep apnea:  Excessive snoring    Diet:  Regular (no restrictions)    Frequency of exercise:  4-5 days/week    Duration of exercise:  15-30 minutes    Taking medications regularly:  No    Barriers to taking medications:  Problems remembering to take them    Medication side effects:  None    PHQ-2 Total Score: 0    Additional concerns today:  Yes (right second toe)    Vertigo - just saw ENT and thinks has meniere's  Fax labs to Dr. Burgos [phone]    Toe - stubbed on boat 7/3 and then dropped heavy planter 7/8        Today's PHQ-2 Score:   PHQ-2 ( 1999 [employer]) 7/22/2019   Q1: Little interest or pleasure in doing things 0   Q2: Feeling down, depressed or hopeless 0   PHQ-2 Score 0   Q1: Little interest or pleasure in doing things Not at all   Q2: Feeling down, depressed or hopeless Not at all   PHQ-2 Score 0       Abuse: Current or Past(Physical, Sexual or Emotional)- No  Do you feel safe in your environment? Yes    Social History     Tobacco Use     Smoking status: Never Smoker     Smokeless tobacco: Never Used   Substance Use Topics     Alcohol use: Yes     Comment: few drinks per night on weekend         Alcohol Use 7/22/2019   Prescreen: >3 drinks/day or >7 drinks/week? No   Prescreen: >3 drinks/day or >7 drinks/week? -       Reviewed orders with patient.  Reviewed health maintenance and updated orders accordingly - Yes  Labs reviewed in EPIC    Mammogram Screening: Patient over age 50, mutual decision to screen reflected in health maintenance.    Pertinent mammograms are reviewed under the imaging tab.  History of abnormal Pap smear: NO - age 30-65 PAP every 5 years with negative HPV co-testing recommended  PAP / HPV Latest Ref Rng & Units  "4/3/2018 11/22/2013 2/2/2010   PAP - NIL NIL NIL   HPV 16 DNA NEG:Negative Negative - -   HPV 18 DNA NEG:Negative Negative - -   OTHER HR HPV NEG:Negative Negative - -     Reviewed and updated as needed this visit by clinical staff  Tobacco  Allergies  Meds  Problems  Med Hx  Surg Hx  Fam Hx  Soc Hx          Reviewed and updated as needed this visit by Provider  Tobacco  Allergies  Meds  Problems  Med Hx  Surg Hx  Fam Hx            Review of Systems   Constitutional: Negative for chills and fever.   HENT: Positive for congestion, ear pain and hearing loss. Negative for sore throat.    Eyes: Negative for pain and visual disturbance.   Respiratory: Negative for cough and shortness of breath.    Cardiovascular: Negative for chest pain, palpitations and peripheral edema.   Gastrointestinal: Positive for nausea. Negative for abdominal pain, constipation, diarrhea, heartburn and hematochezia.   Breasts:  Negative for tenderness, breast mass and discharge.   Genitourinary: Negative for dysuria, frequency, genital sores, hematuria, pelvic pain, urgency, vaginal bleeding and vaginal discharge.   Musculoskeletal: Negative for arthralgias, joint swelling and myalgias.   Skin: Negative for rash.   Neurological: Positive for dizziness. Negative for weakness, headaches and paresthesias.   Psychiatric/Behavioral: Negative for mood changes. The patient is not nervous/anxious.           OBJECTIVE:   /80 (BP Location: Right arm, Patient Position: Chair, Cuff Size: Adult Regular)   Pulse 62   Temp 98.2  F (36.8  C) (Tympanic)   Ht 1.702 m (5' 7\")   Wt 63 kg (138 lb 14.4 oz)   SpO2 99%   BMI 21.75 kg/m    Physical Exam  GENERAL: healthy, alert and no distress  EYES: Eyes grossly normal to inspection, PERRL and conjunctivae and sclerae normal  HENT: ear canals and TM's normal, nose and mouth without ulcers or lesions  NECK: no adenopathy, no asymmetry, masses, or scars and thyroid normal to palpation  RESP: " "lungs clear to auscultation - no rales, rhonchi or wheezes  CV: regular rate and rhythm, normal S1 S2, no S3 or S4, no murmur, click or rub, no peripheral edema and peripheral pulses strong  ABDOMEN: soft, nontender, no hepatosplenomegaly, no masses and bowel sounds normal  MS:  no edema.  Rt middle tow with mild swelling, no focal tenderness, superficial abrasion that is healing  SKIN: no suspicious lesions or rashes  NEURO: Normal strength and tone, mentation intact and speech normal  PSYCH: mentation appears normal, affect normal/bright    Diagnostic Test Results:  Labs reviewed in Epic    ASSESSMENT/PLAN:   1. Routine general medical examination at a health care facility  Routine health education discussed: calcium, diet, exercise, weight, safety.     2. Screening for hyperlipidemia    - Lipid panel reflex to direct LDL Fasting; Future    3. Vertigo  Labs  Per orders, discussed thiazide diuretic use.  Follow-up with ENT  - **Basic metabolic panel FUTURE anytime; Future  - **UA reflex to Microscopic FUTURE anytime; Future  - Lyme Disease Marguerite with reflex to WB Serum; Future  - ondansetron (ZOFRAN-ODT) 4 MG ODT tab; Take 1-2 tablets (4-8 mg) by mouth every 8 hours as needed for nausea  Dispense: 10 tablet; Refill: 1    4. Need for hepatitis C screening test    - Hepatitis C Screen Reflex to HCV RNA Quant and Genotype; Future    5. Screening for diabetes mellitus    - **Basic metabolic panel FUTURE anytime; Future    6. Nasal congestion   refilled  - fluticasone (FLONASE) 50 MCG/ACT nasal spray; Spray 1-2 sprays into both nostrils daily  Dispense: 16 g; Refill: 11    COUNSELING:  Reviewed preventive health counseling, as reflected in patient instructions    Estimated body mass index is 21.75 kg/m  as calculated from the following:    Height as of this encounter: 1.702 m (5' 7\").    Weight as of this encounter: 63 kg (138 lb 14.4 oz).         reports that she has never smoked. She has never used smokeless " tobacco.      Counseling Resources:  ATP IV Guidelines  Pooled Cohorts Equation Calculator  Breast Cancer Risk Calculator  FRAX Risk Assessment  ICSI Preventive Guidelines  Dietary Guidelines for Americans, 2010  USDA's MyPlate  ASA Prophylaxis  Lung CA Screening    Alicia Fisher MD  [facility]

## 2019-07-26 DIAGNOSIS — Z11.59 NEED FOR HEPATITIS C SCREENING TEST: ICD-10-CM

## 2019-07-26 DIAGNOSIS — R42 VERTIGO: ICD-10-CM

## 2019-07-26 DIAGNOSIS — Z13.220 SCREENING FOR HYPERLIPIDEMIA: ICD-10-CM

## 2019-07-26 DIAGNOSIS — Z13.1 SCREENING FOR DIABETES MELLITUS: ICD-10-CM

## 2019-07-26 LAB
ALBUMIN UR-MCNC: NEGATIVE MG/DL
ANION GAP SERPL CALCULATED.3IONS-SCNC: 6 MMOL/L (ref 3–14)
APPEARANCE UR: CLEAR
B BURGDOR IGG+IGM SER QL: 0.31 (ref 0–0.89)
BACTERIA #/AREA URNS HPF: ABNORMAL /HPF
BILIRUB UR QL STRIP: NEGATIVE
BUN SERPL-MCNC: 13 MG/DL (ref 7–30)
CALCIUM SERPL-MCNC: 8.6 MG/DL (ref 8.5–10.1)
CHLORIDE SERPL-SCNC: 107 MMOL/L (ref 94–109)
CHOLEST SERPL-MCNC: 154 MG/DL
CO2 SERPL-SCNC: 26 MMOL/L (ref 20–32)
COLOR UR AUTO: YELLOW
CREAT SERPL-MCNC: 0.69 MG/DL (ref 0.52–1.04)
GFR SERPL CREATININE-BSD FRML MDRD: >90 ML/MIN/{1.73_M2}
GLUCOSE SERPL-MCNC: 89 MG/DL (ref 70–99)
GLUCOSE UR STRIP-MCNC: NEGATIVE MG/DL
HCV AB SERPL QL IA: NONREACTIVE
HDLC SERPL-MCNC: 59 MG/DL
HGB UR QL STRIP: ABNORMAL
KETONES UR STRIP-MCNC: NEGATIVE MG/DL
LDLC SERPL CALC-MCNC: 83 MG/DL
LEUKOCYTE ESTERASE UR QL STRIP: NEGATIVE
MUCOUS THREADS #/AREA URNS LPF: PRESENT /LPF
NITRATE UR QL: NEGATIVE
NON-SQ EPI CELLS #/AREA URNS LPF: ABNORMAL /LPF
NONHDLC SERPL-MCNC: 95 MG/DL
PH UR STRIP: 6.5 PH (ref 5–7)
POTASSIUM SERPL-SCNC: 4.1 MMOL/L (ref 3.4–5.3)
RBC #/AREA URNS AUTO: ABNORMAL /HPF
SODIUM SERPL-SCNC: 139 MMOL/L (ref 133–144)
SOURCE: ABNORMAL
SP GR UR STRIP: 1.02 (ref 1–1.03)
TRIGL SERPL-MCNC: 59 MG/DL
UROBILINOGEN UR STRIP-ACNC: 0.2 EU/DL (ref 0.2–1)
WBC #/AREA URNS AUTO: ABNORMAL /HPF

## 2019-07-26 PROCEDURE — 36415 COLL VENOUS BLD VENIPUNCTURE: CPT | Performed by: INTERNAL MEDICINE

## 2019-07-26 PROCEDURE — 86803 HEPATITIS C AB TEST: CPT | Performed by: INTERNAL MEDICINE

## 2019-07-26 PROCEDURE — 86618 LYME DISEASE ANTIBODY: CPT | Performed by: INTERNAL MEDICINE

## 2019-07-26 PROCEDURE — 80061 LIPID PANEL: CPT | Performed by: INTERNAL MEDICINE

## 2019-07-26 PROCEDURE — 81001 URINALYSIS AUTO W/SCOPE: CPT | Performed by: INTERNAL MEDICINE

## 2019-07-26 PROCEDURE — 80048 BASIC METABOLIC PNL TOTAL CA: CPT | Performed by: INTERNAL MEDICINE

## 2019-07-29 NOTE — TELEPHONE ENCOUNTER
Called patient to confirm which labs needed to be sent. Per chart review CMP was faxed already but ENT still needs UA and Lyme's results. Forwarded to MA/TC to fax remaining labs.

## 2019-07-30 ENCOUNTER — TRANSFERRED RECORDS (OUTPATIENT)
Dept: HEALTH INFORMATION MANAGEMENT | Facility: CLINIC | Age: 59
End: 2019-07-30

## 2019-08-07 DIAGNOSIS — R31.29 MICROSCOPIC HEMATURIA: ICD-10-CM

## 2019-08-07 DIAGNOSIS — H81.01 ENDOLYMPHATIC HYDROPS, RIGHT: ICD-10-CM

## 2019-08-07 LAB
ALBUMIN UR-MCNC: NEGATIVE MG/DL
APPEARANCE UR: CLEAR
BACTERIA #/AREA URNS HPF: ABNORMAL /HPF
BILIRUB UR QL STRIP: NEGATIVE
COLOR UR AUTO: YELLOW
CREAT UR-MCNC: 88 MG/DL
GLUCOSE UR STRIP-MCNC: NEGATIVE MG/DL
HGB UR QL STRIP: ABNORMAL
KETONES UR STRIP-MCNC: NEGATIVE MG/DL
LEUKOCYTE ESTERASE UR QL STRIP: NEGATIVE
MICROALBUMIN UR-MCNC: 7 MG/L
MICROALBUMIN/CREAT UR: 8.1 MG/G CR (ref 0–25)
MUCOUS THREADS #/AREA URNS LPF: PRESENT /LPF
NITRATE UR QL: NEGATIVE
NON-SQ EPI CELLS #/AREA URNS LPF: ABNORMAL /LPF
PH UR STRIP: 6 PH (ref 5–7)
RBC #/AREA URNS AUTO: ABNORMAL /HPF
SOURCE: ABNORMAL
SP GR UR STRIP: 1.01 (ref 1–1.03)
UROBILINOGEN UR STRIP-ACNC: 0.2 EU/DL (ref 0.2–1)
WBC #/AREA URNS AUTO: ABNORMAL /HPF

## 2019-08-07 PROCEDURE — 87086 URINE CULTURE/COLONY COUNT: CPT | Performed by: OTOLARYNGOLOGY

## 2019-08-07 PROCEDURE — 82043 UR ALBUMIN QUANTITATIVE: CPT | Performed by: OTOLARYNGOLOGY

## 2019-08-07 PROCEDURE — 81001 URINALYSIS AUTO W/SCOPE: CPT | Performed by: OTOLARYNGOLOGY

## 2019-08-08 LAB
BACTERIA SPEC CULT: NO GROWTH
SPECIMEN SOURCE: NORMAL

## 2019-08-13 ENCOUNTER — MYC MEDICAL ADVICE (OUTPATIENT)
Dept: PEDIATRICS | Facility: CLINIC | Age: 59
End: 2019-08-13

## 2019-08-13 DIAGNOSIS — R31.29 MICROSCOPIC HEMATURIA: Primary | ICD-10-CM

## 2019-08-26 ENCOUNTER — HOSPITAL ENCOUNTER (OUTPATIENT)
Dept: CT IMAGING | Facility: CLINIC | Age: 59
Discharge: HOME | End: 2019-08-26
Attending: INTERNAL MEDICINE | Admitting: INTERNAL MEDICINE
Payer: COMMERCIAL

## 2019-08-26 DIAGNOSIS — R31.29 MICROSCOPIC HEMATURIA: ICD-10-CM

## 2019-08-26 PROCEDURE — 74178 CT ABD&PLV WO CNTR FLWD CNTR: CPT

## 2019-08-26 PROCEDURE — 25000128 H RX IP 250 OP 636: Performed by: RADIOLOGY

## 2019-08-26 PROCEDURE — 25000125 ZZHC RX 250: Performed by: RADIOLOGY

## 2019-08-26 RX ORDER — IOPAMIDOL 755 MG/ML
500 INJECTION, SOLUTION INTRAVASCULAR ONCE
Status: COMPLETED | OUTPATIENT
Start: 2019-08-26 | End: 2019-08-26

## 2019-08-26 RX ADMIN — SODIUM CHLORIDE 65 ML: 9 INJECTION, SOLUTION INTRAVENOUS at 15:39

## 2019-08-26 RX ADMIN — IOPAMIDOL 100 ML: 755 INJECTION, SOLUTION INTRAVENOUS at 15:39

## 2019-10-14 ENCOUNTER — TRANSFERRED RECORDS (OUTPATIENT)
Dept: HEALTH INFORMATION MANAGEMENT | Facility: CLINIC | Age: 59
End: 2019-10-14

## 2019-11-04 ENCOUNTER — HEALTH MAINTENANCE LETTER (OUTPATIENT)
Age: 59
End: 2019-11-04

## 2020-02-11 DIAGNOSIS — Z12.31 VISIT FOR SCREENING MAMMOGRAM: ICD-10-CM

## 2020-02-11 PROCEDURE — 77067 SCR MAMMO BI INCL CAD: CPT | Mod: TC

## 2020-02-11 PROCEDURE — 77063 BREAST TOMOSYNTHESIS BI: CPT | Mod: TC

## 2020-11-16 ENCOUNTER — HEALTH MAINTENANCE LETTER (OUTPATIENT)
Age: 60
End: 2020-11-16

## 2021-01-07 ENCOUNTER — TRANSFERRED RECORDS (OUTPATIENT)
Dept: HEALTH INFORMATION MANAGEMENT | Facility: CLINIC | Age: 61
End: 2021-01-07

## 2021-01-18 ENCOUNTER — ANCILLARY PROCEDURE (OUTPATIENT)
Dept: GENERAL RADIOLOGY | Facility: CLINIC | Age: 61
End: 2021-01-18
Attending: PHYSICIAN ASSISTANT
Payer: COMMERCIAL

## 2021-01-18 ENCOUNTER — OFFICE VISIT (OUTPATIENT)
Dept: PEDIATRICS | Facility: CLINIC | Age: 61
End: 2021-01-18
Payer: COMMERCIAL

## 2021-01-18 VITALS
SYSTOLIC BLOOD PRESSURE: 118 MMHG | RESPIRATION RATE: 16 BRPM | WEIGHT: 145 LBS | BODY MASS INDEX: 22.71 KG/M2 | DIASTOLIC BLOOD PRESSURE: 82 MMHG | HEART RATE: 85 BPM | OXYGEN SATURATION: 97 % | TEMPERATURE: 98.7 F

## 2021-01-18 DIAGNOSIS — S69.91XA INJURY OF HAND, RIGHT, INITIAL ENCOUNTER: ICD-10-CM

## 2021-01-18 DIAGNOSIS — S62.622A CLOSED DISPLACED FRACTURE OF MIDDLE PHALANX OF RIGHT MIDDLE FINGER, INITIAL ENCOUNTER: Primary | ICD-10-CM

## 2021-01-18 PROCEDURE — 73130 X-RAY EXAM OF HAND: CPT | Mod: RT | Performed by: RADIOLOGY

## 2021-01-18 PROCEDURE — 99213 OFFICE O/P EST LOW 20 MIN: CPT | Performed by: PHYSICIAN ASSISTANT

## 2021-01-18 NOTE — PROGRESS NOTES
Assessment & Plan   (S62.622A) Closed displaced fracture of middle phalanx of right middle finger, initial encounter  (primary encounter diagnosis)  Comment: x 7 weeks. Persistent pain. Patient referred to hand ortho for further management.   Plan: XR Hand Right G/E 3 Views, Orthopedic & Spine          Referral          PHOENIX Mott [facility] MIRA Locke is a 61 year old who presents to clinic today for the following health issues:    HPI   Musculoskeletal problem/pain  Onset/Duration: 12/05/2020  Description  Location: hand - right/middle finger  Joint Swelling: YES  Redness: YES  Pain: YES  Ecchymosis improved.  Warmth: no  Intensity:  mild, moderate  Progression of Symptoms:  same and waxing and waning  Accompanying signs and symptoms:   Fevers: no  Numbness/tingling/weakness: YES  History  Trauma to the area: YES- fell down stairs; caught finger on railing. Felt immediate pain.   Recent illness:  no  Previous similar problem: no  Previous evaluation:  no  Precipitating or alleviating factors:  Aggravating factors include: overuse  Therapies tried and outcome: rest/inactivity, ice, support wrap and Ibuprofen    Right hand dominant.   Types at work.    Review of Systems   Constitutional, HEENT, cardiovascular, pulmonary, gi and gu systems are negative, except as otherwise noted.      Objective    /82   Pulse 85   Temp 98.7  F (37.1  C) (Tympanic)   Resp 16   Wt 65.8 kg (145 lb)   SpO2 97%   BMI 22.71 kg/m    Body mass index is 22.71 kg/m .  Physical Exam   ORTHO: Hand/Finger Exam: Inspection: diffuse edema over the right third PIP joint. Tender to palpation. Unable to flex fully.     Xray - Reviewed and interpreted by me reveals a fracture of the right third middle phalynx

## 2021-01-19 ENCOUNTER — OFFICE VISIT (OUTPATIENT)
Dept: ORTHOPEDICS | Facility: CLINIC | Age: 61
End: 2021-01-19
Payer: COMMERCIAL

## 2021-01-19 VITALS
SYSTOLIC BLOOD PRESSURE: 126 MMHG | WEIGHT: 145 LBS | HEIGHT: 67 IN | BODY MASS INDEX: 22.76 KG/M2 | DIASTOLIC BLOOD PRESSURE: 76 MMHG

## 2021-01-19 DIAGNOSIS — S62.622A CLOSED DISPLACED FRACTURE OF MIDDLE PHALANX OF RIGHT MIDDLE FINGER, INITIAL ENCOUNTER: ICD-10-CM

## 2021-01-19 PROCEDURE — 99204 OFFICE O/P NEW MOD 45 MIN: CPT | Performed by: STUDENT IN AN ORGANIZED HEALTH CARE EDUCATION/TRAINING PROGRAM

## 2021-01-19 RX ORDER — MECLIZINE HYDROCHLORIDE 25 MG/1
25 TABLET ORAL 3 TIMES DAILY PRN
COMMUNITY
End: 2022-04-11

## 2021-01-19 ASSESSMENT — MIFFLIN-ST. JEOR: SCORE: 1265.35

## 2021-01-19 NOTE — LETTER
1/19/2021         RE: Celeste Alcala  [address]  [place] [address]        Dear Colleague,    Thank you for referring your patient, Celeste Alcala, to the [facility] [place]. Please see a copy of my visit note below.        [facility]  Orthopaedic Surgery Consultation by Robert Malin M.D.    Celeste Aclala MRN# [id number]   Age: 61 year old YOB: 1960     Requesting physician: Reshma Ring*  Alicia Fisher     Background history:  DX:  1. None             History of Present Illness:     61 year old female presents our clinic because of a volar plate avulsion fracture of the PIP third digit right hand.  Patient is right-hand dominant.  She works as a  and does a lot of typing.  One of her hobbies is sewing which is painful.  Approximately 7 weeks ago while patient was hanging [place] decorations she fell down the stairs and hit her right hand against the banister.  Ever since then she has been experiencing pain on the volar side of her PIP joint third digit right hand.  She has tried jenaro taping and resting it but the pain persists.  Denies the presence of any instability of the PIP joint.  She has never had an extension splint fitted.  Because of these persistent complaints she visited her PCP yesterday.    Current symptoms:  Problem: right long finger fracture  Onset and duration: ~ 7 weeks ago, 12/5/20  Awakens from sleep due to sx's:  No  Precipitating Injury:  Yes  - patient was hanging J[employer] lights above her cabinets, she was standing on a 6 inch wide banister and lost her balance falling backwards. She states she reached for the banister and believes that she injured her finger while grabbing the banister.   Other joints or sites painful:  No    Social:   Occupation: , computer work- frequent typing. Tolerates well.   Living situation: lives with her spouse.   Hobbies / Sports:  "Sewing    Smoking: No  Alcohol: Yes  Illicit drug use: No         Physical Exam:     EXAMINATION pertinent findings:   PSYCH: Pleasant, healthy-appearing, alert, oriented x3, cooperative. Normal mood and affect.  VITAL SIGNS: Blood pressure 126/76, height 1.702 m (5' 7\"), weight 65.8 kg (145 lb).  Reviewed nursing intake notes.   Body mass index is 22.71 kg/m .  RESP: non labored breathing   ABD: benign, soft, non-tender, no acute peritoneal findings  SKIN: grossly normal   LYMPHATIC: grossly normal, no adenopathy, no extremity edema  NEURO: grossly normal , no motor deficits  VASCULAR: satisfactory perfusion of all extremities   MUSCULOSKELETAL:     Normal appearance of right hand and wrist.  Decrease in range flexion at the level of the PIP joint of approximately 20 degrees.  Full extension.  Stable PIP joint.  Neurovascular intact right upper extremity.  Tenderness to palpation over the volar side of the PIP joint consistent with the volar plate avulsion fracture.         Data:   All laboratory data reviewed  All imaging studies reviewed by me personally.  XR hand right 1/18/2021:  Small minimally displaced avulsion fracture of base middle phalanx third digit volar side consistent with volar plate lesion.            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  So far suboptimally treated nonoperatively.     Plan:  I discussed my findings with the patient.  Because of the chronicity of the pathology she potentially could be better served by a surgical intervention instead of nonoperative treatment consisting of an extension splint and 20-30 degrees of flexion.  Therefore, I referred her to our hand specialist here at [facility] Dr. Patterson.  Patient understands and agrees to the treatment plan as set forth.      Robert Malin MD, PhD     [facility] [place] Department of Orthopaedic Surgery  Pager [phone]      DATA for DOCUMENTATION:         Past Medical History:     Patient Active Problem List   Diagnosis     CARDIOVASCULAR SCREENING; LDL GOAL LESS THAN 160     Basal cell carcinoma of auricle of ear     Past Medical History:   Diagnosis Date     Unspecified sinusitis (chronic)        Also see scanned health assessment forms.       Past Surgical History:     Past Surgical History:   Procedure Laterality Date     BIOPSY      melanoma biopsy     COLONOSCOPY  2012    Procedure:COLONOSCOPY;  Colonoscopy   ; Surgeon:BRIAN NAGEL; Location: GI     ENT SURGERY      tonsils     ZZC NONSPECIFIC PROCEDURE      (R) knee surgery-leg length discrepency     ZZC NONSPECIFIC PROCEDURE      T&A     ZZC NONSPECIFIC PROCEDURE       x 2            Social History:     Social History     Socioeconomic History     Marital status:      Spouse name: Not on file     Number of children: Not on file     Years of education: Not on file     Highest education level: Not on file   Occupational History     Occupation: [employer]   Social Needs     Financial resource strain: Not on file     Food insecurity     Worry: Not on file     Inability: Not on file     Transportation needs     Medical: Not on file     Non-medical: Not on file   Tobacco Use     Smoking status: Never Smoker     Smokeless tobacco: Never Used   Substance and Sexual Activity     Alcohol use: Yes     Comment: few drinks per night on weekend     Drug use: No     Sexual activity: Yes     Partners: Male     Birth control/protection: Surgical     Comment: vasectomy   Lifestyle     Physical activity     Days per week: Not on file     Minutes per session: Not on file     Stress: Not on file   Relationships     Social connections     Talks on phone: Not on file     Gets together: Not on file     Attends [employer] service: Not on file     Active member of club or organization: Not on file      Attends meetings of clubs or organizations: Not on file     Relationship status: Not on file     Intimate partner violence     Fear of current or ex partner: Not on file     Emotionally abused: Not on file     Physically abused: Not on file     Forced sexual activity: Not on file   Other Topics Concern     Parent/sibling w/ CABG, MI or angioplasty before 65F 55M? No   Social History Narrative     Not on file            Family History:       Family History   Problem Relation Age of Onset     Thyroid Disease Mother         graves     Musculoskeletal Disorder Sister         MS     Other Cancer Sister             Medications:     Current Outpatient Medications   Medication Sig     CALCIUM CARBONATE PO      fluticasone (FLONASE) 50 MCG/ACT nasal spray Spray 1-2 sprays into both nostrils daily     meclizine (ANTIVERT) 25 MG tablet Take 25 mg by mouth 3 times daily as needed for dizziness     multivitamin, therapeutic with minerals (MULTI-VITAMIN) TABS tablet Take 1 tablet by mouth daily     ondansetron (ZOFRAN-ODT) 4 MG ODT tab Take 1-2 tablets (4-8 mg) by mouth every 8 hours as needed for nausea     No current facility-administered medications for this visit.               Review of Systems:   A comprehensive 10 point review of systems (constitutional, ENT, cardiac, peripheral vascular, lymphatic, respiratory, GI, , Musculoskeletal, skin, Neurological) was performed and found to be negative except as described in this note.     See intake form completed by patient          Again, thank you for allowing me to participate in the care of your patient.        Sincerely,        Robert Malin MD

## 2021-01-19 NOTE — PATIENT INSTRUCTIONS
1. Closed displaced fracture of middle phalanx of right middle finger, initial encounter      Recommend follow up with my partner, Dr. Bryanna Patterson- hand specialist.     Call my office with any questions or concerns, [phone].

## 2021-01-19 NOTE — PROGRESS NOTES
"    [facility]  Orthopaedic Surgery Consultation by Robert Malin M.D.    Celeste Alcala MRN# [id number]   Age: 61 year old YOB: 1960     Requesting physician: Reshma Ring*  Alicia Fisher     Background history:  DX:  1. None             History of Present Illness:     61 year old female presents our clinic because of a volar plate avulsion fracture of the PIP third digit right hand.  Patient is right-hand dominant.  She works as a  and does a lot of typing.  One of her hobbies is sewing which is painful.  Approximately 7 weeks ago while patient was hanging Jm decorations she fell down the stairs and hit her right hand against the banister.  Ever since then she has been experiencing pain on the volar side of her PIP joint third digit right hand.  She has tried jenaro taping and resting it but the pain persists.  Denies the presence of any instability of the PIP joint.  She has never had an extension splint fitted.  Because of these persistent complaints she visited her PCP yesterday.    Current symptoms:  Problem: right long finger fracture  Onset and duration: ~ 7 weeks ago, 12/5/20  Awakens from sleep due to sx's:  No  Precipitating Injury:  Yes  - patient was hanging Jm lights above her cabinets, she was standing on a 6 inch wide banister and lost her balance falling backwards. She states she reached for the banister and believes that she injured her finger while grabbing the banister.   Other joints or sites painful:  No    Social:   Occupation: , computer work- frequent typing. Tolerates well.   Living situation: lives with her spouse.   Hobbies / Sports: Sewing    Smoking: No  Alcohol: Yes  Illicit drug use: No         Physical Exam:     EXAMINATION pertinent findings:   PSYCH: Pleasant, healthy-appearing, alert, oriented x3, cooperative. Normal mood and affect.  VITAL SIGNS: Blood pressure 126/76, height 1.702 m (5' 7\"), weight " 65.8 kg (145 lb).  Reviewed nursing intake notes.   Body mass index is 22.71 kg/m .  RESP: non labored breathing   ABD: benign, soft, non-tender, no acute peritoneal findings  SKIN: grossly normal   LYMPHATIC: grossly normal, no adenopathy, no extremity edema  NEURO: grossly normal , no motor deficits  VASCULAR: satisfactory perfusion of all extremities   MUSCULOSKELETAL:     Normal appearance of right hand and wrist.  Decrease in range flexion at the level of the PIP joint of approximately 20 degrees.  Full extension.  Stable PIP joint.  Neurovascular intact right upper extremity.  Tenderness to palpation over the volar side of the PIP joint consistent with the volar plate avulsion fracture.         Data:   All laboratory data reviewed  All imaging studies reviewed by me personally.  XR hand right 1/18/2021:  Small minimally displaced avulsion fracture of base middle phalanx third digit volar side consistent with volar plate lesion.            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  So far suboptimally treated nonoperatively.     Plan:  I discussed my findings with the patient.  Because of the chronicity of the pathology she potentially could be better served by a surgical intervention instead of nonoperative treatment consisting of an extension splint and 20-30 degrees of flexion.  Therefore, I referred her to our hand specialist here at [facility] Dr. Patterson.  Patient understands and agrees to the treatment plan as set forth.      Robert Malin MD, PhD     Adult Reconstruction  [facility] Department of Orthopaedic Surgery  Pager [phone]      DATA for DOCUMENTATION:         Past Medical History:     Patient Active Problem List   Diagnosis     CARDIOVASCULAR SCREENING; LDL GOAL LESS THAN 160     Basal cell carcinoma of auricle of ear     Past Medical  History:   Diagnosis Date     Unspecified sinusitis (chronic)        Also see scanned health assessment forms.       Past Surgical History:     Past Surgical History:   Procedure Laterality Date     BIOPSY      melanoma biopsy     COLONOSCOPY  2012    Procedure:COLONOSCOPY;  Colonoscopy   ; Surgeon:BRIAN NAGEL; Location: GI     ENT SURGERY      tonsils     ZZC NONSPECIFIC PROCEDURE      (R) knee surgery-leg length discrepency     ZZC NONSPECIFIC PROCEDURE      T&A     ZZC NONSPECIFIC PROCEDURE       x 2            Social History:     Social History     Socioeconomic History     Marital status:      Spouse name: Not on file     Number of children: Not on file     Years of education: Not on file     Highest education level: Not on file   Occupational History     Occupation: [employer]   Social Needs     Financial resource strain: Not on file     Food insecurity     Worry: Not on file     Inability: Not on file     Transportation needs     Medical: Not on file     Non-medical: Not on file   Tobacco Use     Smoking status: Never Smoker     Smokeless tobacco: Never Used   Substance and Sexual Activity     Alcohol use: Yes     Comment: few drinks per night on weekend     Drug use: No     Sexual activity: Yes     Partners: Male     Birth control/protection: Surgical     Comment: vasectomy   Lifestyle     Physical activity     Days per week: Not on file     Minutes per session: Not on file     Stress: Not on file   Relationships     Social connections     Talks on phone: Not on file     Gets together: Not on file     Attends Church service: Not on file     Active member of club or organization: Not on file     Attends meetings of clubs or organizations: Not on file     Relationship status: Not on file     Intimate partner violence     Fear of current or ex partner: Not on file     Emotionally abused: Not on file     Physically abused: Not on file     Forced sexual activity: Not on file    Other Topics Concern     Parent/sibling w/ CABG, MI or angioplasty before 65F 55M? No   Social History Narrative     Not on file            Family History:       Family History   Problem Relation Age of Onset     Thyroid Disease Mother         graves     Musculoskeletal Disorder Sister         MS     Other Cancer Sister             Medications:     Current Outpatient Medications   Medication Sig     CALCIUM CARBONATE PO      fluticasone (FLONASE) 50 MCG/ACT nasal spray Spray 1-2 sprays into both nostrils daily     meclizine (ANTIVERT) 25 MG tablet Take 25 mg by mouth 3 times daily as needed for dizziness     multivitamin, therapeutic with minerals (MULTI-VITAMIN) TABS tablet Take 1 tablet by mouth daily     ondansetron (ZOFRAN-ODT) 4 MG ODT tab Take 1-2 tablets (4-8 mg) by mouth every 8 hours as needed for nausea     No current facility-administered medications for this visit.               Review of Systems:   A comprehensive 10 point review of systems (constitutional, ENT, cardiac, peripheral vascular, lymphatic, respiratory, GI, , Musculoskeletal, skin, Neurological) was performed and found to be negative except as described in this note.     See intake form completed by patient

## 2021-01-20 ENCOUNTER — OFFICE VISIT (OUTPATIENT)
Dept: ORTHOPEDICS | Facility: CLINIC | Age: 61
End: 2021-01-20
Payer: COMMERCIAL

## 2021-01-20 VITALS
DIASTOLIC BLOOD PRESSURE: 78 MMHG | SYSTOLIC BLOOD PRESSURE: 109 MMHG | WEIGHT: 145 LBS | HEIGHT: 67 IN | BODY MASS INDEX: 22.76 KG/M2

## 2021-01-20 DIAGNOSIS — S62.629A CLOSED AVULSION FRACTURE OF MIDDLE PHALANX OF FINGER, INITIAL ENCOUNTER: Primary | ICD-10-CM

## 2021-01-20 PROCEDURE — 99213 OFFICE O/P EST LOW 20 MIN: CPT | Performed by: STUDENT IN AN ORGANIZED HEALTH CARE EDUCATION/TRAINING PROGRAM

## 2021-01-20 ASSESSMENT — MIFFLIN-ST. JEOR: SCORE: 1265.35

## 2021-01-20 NOTE — PATIENT INSTRUCTIONS
Alexis splint straps given     Follow up with Dr. Patterson in 3 weeks    Call my office with any questions or concerns, [phone].

## 2021-01-20 NOTE — PROGRESS NOTES
Hand Surgery History & Physical    REFERRING PHYSICIAN: No ref. provider found   PRIMARY CARE PHYSICIAN: Alicia Fisher           Chief Complaint:   Right long finger fracture    History of Present Illness:  Symptom Profile Including: location of symptoms, onset, severity, exacerbating/alleviating factors, previous treatments:        Celeste Alcala is a 61 year old RHD female who presents for evaluation of right long finger volar plate avulsion fracture.    Injury: On 12/5/2020 vs 12/12/2020 she was hanging [place] decorations when she fell and hit her right hand on a banister.  She experienced pain on the volar aspect of her PIP joint.  Denies lala dislocation or need for manual reduction.  She did not initially seek medical attention.    Pain: This is localized to the volar long finger PIP joint and is up to 10/10 in severity with strenuous weightbearing activity, such as after lifting furniture and heavy boxes last week, but generally stays between a 2-5/10. It is worsened by PIP joint range of motion. It is improved by rest. It does not wake the patient from sleep.  Overall her pain and associated swelling have improved since the time of injury.  Prior treatments included jenaro taping and rest.  She denies any formal splinting.  Denies sensation of instability with her long finger.    She works as a  which involves frequent typing and computer work.  Her current injury has not limited her ability to perform her work duties.           Past Medical History:     Past Medical History:   Diagnosis Date     Unspecified sinusitis (chronic)             Past Surgical History:     Past Surgical History:   Procedure Laterality Date     BIOPSY      melanoma biopsy     COLONOSCOPY  2/16/2012    Procedure:COLONOSCOPY;  Colonoscopy   ; Surgeon:BRIAN NAGEL; Location: GI     ENT SURGERY      tonsils     [facility] NONSPECIFIC PROCEDURE      (R) knee surgery-leg length discrepency     [facility] NONSPECIFIC  PROCEDURE      T&A     ZZC NONSPECIFIC PROCEDURE       x 2            Social History:     Social History     Tobacco Use     Smoking status: Never Smoker     Smokeless tobacco: Never Used   Substance Use Topics     Alcohol use: Yes     Comment: few drinks per night on weekend            Family History:     Family History   Problem Relation Age of Onset     Thyroid Disease Mother         graves     Musculoskeletal Disorder Sister         MS     Other Cancer Sister             Allergies:     Allergies   Allergen Reactions     No Known Drug Allergies             Medications:     Current Outpatient Medications   Medication     CALCIUM CARBONATE PO     fluticasone (FLONASE) 50 MCG/ACT nasal spray     meclizine (ANTIVERT) 25 MG tablet     multivitamin, therapeutic with minerals (MULTI-VITAMIN) TABS tablet     ondansetron (ZOFRAN-ODT) 4 MG ODT tab     No current facility-administered medications for this visit.              Review of Systems:     A 10 point ROS was performed and reviewed. Specific responses to these questions are noted at the end of the document.         Physical Exam:   Vitals: There were no vitals taken for this visit.    Physical Exam Adult:  General: Well-nourished, alert, cooperative with exam and in no acute distress  HEENT: Atraumatic, normocephalic, extraocular movements intact, moist mucous membranes, trachea midline  Pulmonary: Unlabored work of breathing, on room air  Cardiovascular: Warm and well-perfused extremities. 2+ radial pulses  Skin: Warm, intact without rashes to the upper extremities, head or neck   Gait: Normal  Neuro/psych: Oriented to time, place and person. Affect is appropriate    Musculoskeletal: A focused physical examination of the right hand reveals:   Inspection- no evidence of deformity, malalignment, or ecchymosis.  Mild to moderate swelling centered about the PIP joint of the long finger. No swan neck deformity.  Palpation- tender to palpation over the volar aspect of  the long finger PIP joint  Neurovascular- intact light touch sensation and motor to distribution of the median, ulnar and radial nerves. Brisk capillary refill to the distal fingers. 2+ radial pulse.   Long finger range of Motion:   -Able to make nearly a full fist, with tip of long finger within 1 cm of the palmar crease  -PIP joint: 130 degrees of flexion  -No swan-necking  Stability: no dislocation, subluxation or laxity with radial or ulnar deviation stress at the PIP joint.  Muscle strength and tone: No atrophy, spasticity or flaccidness. 5/5 strength EPL, FPL, APB, first dorsal interosseous.    Tendon: FDS and FDP intact to all fingers. Normal tenodesis effect.            Imaging:   3 view X-ray of the right long finger was independently interpreted by me. The results were discussed with the patient.  Findings include:  -Small minimally displaced avulsion fracture of the long finger volar middle phalangeal base, seen best on oblique view.  PIP joint concentrically reduced otherwise.  -No additional appreciable osseous abnormalities.             Assessment and Plan:   Assessment:  61 year old RHD female with right long finger middle phalanx volar plate avulsion fracture sustained 5 to 6 weeks ago.  Nearly full range of motion and no evidence of swan-neck deformity on exam.     Plan:  -Given no consistent period of immobilization, recommended jenaro taping at all times for the next 3 weeks.  Jenaro taping straps were provided.  She may remove the jenaro tape for hygiene but should otherwise wear the jenaro taping straps full-time  -Recommended she limit strenuous physical activity with her right hand.  -Counseled that even after adequate treatment, pain and swelling may not resolve until 6 months after injury.   -Follow-up 3 weeks for clinical recheck.  No x-rays needed      Bryanna Patterson MD  Department of Orthopedic Surgery  Hand Surgery

## 2021-01-20 NOTE — LETTER
1/20/2021         RE: Celeste Alcala  [address]  [facility] [address]        Dear Colleague,    Thank you for referring your patient, Celeste Alcala, to the [facility] [place]. Please see a copy of my visit note below.    Hand Surgery History & Physical    REFERRING PHYSICIAN: No ref. provider found   PRIMARY CARE PHYSICIAN: Alicia Fisher           Chief Complaint:   Right long finger fracture    History of Present Illness:  Symptom Profile Including: location of symptoms, onset, severity, exacerbating/alleviating factors, previous treatments:        Celeste Alcala is a 61 year old RHD female who presents for evaluation of right long finger volar plate avulsion fracture.    Injury: On 12/5/2020 vs 12/12/2020 she was hanging [employer] decorations when she fell and hit her right hand on a banister.  She experienced pain on the volar aspect of her PIP joint.  Denies lala dislocation or need for manual reduction.  She did not initially seek medical attention.    Pain: This is localized to the volar long finger PIP joint and is up to 10/10 in severity with strenuous weightbearing activity, such as after lifting furniture and heavy boxes last week, but generally stays between a 2-5/10. It is worsened by PIP joint range of motion. It is improved by rest. It does not wake the patient from sleep.  Overall her pain and associated swelling have improved since the time of injury.  Prior treatments included jenaro taping and rest.  She denies any formal splinting.  Denies sensation of instability with her long finger.    She works as a  which involves frequent typing and computer work.  Her current injury has not limited her ability to perform her work duties.           Past Medical History:     Past Medical History:   Diagnosis Date     Unspecified sinusitis (chronic)             Past Surgical History:     Past Surgical History:   Procedure Laterality Date      BIOPSY      melanoma biopsy     COLONOSCOPY  2012    Procedure:COLONOSCOPY;  Colonoscopy   ; Surgeon:BRIAN NAGEL; Location: GI     ENT SURGERY      tonsils     [facility] NONSPECIFIC PROCEDURE      (R) knee surgery-leg length discrepency     [facility] NONSPECIFIC PROCEDURE      T&A     ZZC NONSPECIFIC PROCEDURE       x 2            Social History:     Social History     Tobacco Use     Smoking status: Never Smoker     Smokeless tobacco: Never Used   Substance Use Topics     Alcohol use: Yes     Comment: few drinks per night on weekend            Family History:     Family History   Problem Relation Age of Onset     Thyroid Disease Mother         graves     Musculoskeletal Disorder Sister         MS     Other Cancer Sister             Allergies:     Allergies   Allergen Reactions     No Known Drug Allergies             Medications:     Current Outpatient Medications   Medication     CALCIUM CARBONATE PO     fluticasone (FLONASE) 50 MCG/ACT nasal spray     meclizine (ANTIVERT) 25 MG tablet     multivitamin, therapeutic with minerals (MULTI-VITAMIN) TABS tablet     ondansetron (ZOFRAN-ODT) 4 MG ODT tab     No current facility-administered medications for this visit.              Review of Systems:     A 10 point ROS was performed and reviewed. Specific responses to these questions are noted at the end of the document.         Physical Exam:   Vitals: There were no vitals taken for this visit.    Physical Exam Adult:  General: Well-nourished, alert, cooperative with exam and in no acute distress  HEENT: Atraumatic, normocephalic, extraocular movements intact, moist mucous membranes, trachea midline  Pulmonary: Unlabored work of breathing, on room air  Cardiovascular: Warm and well-perfused extremities. 2+ radial pulses  Skin: Warm, intact without rashes to the upper extremities, head or neck   Gait: Normal  Neuro/psych: Oriented to time, place and person. Affect is appropriate    Musculoskeletal: A focused  physical examination of the right hand reveals:   Inspection- no evidence of deformity, malalignment, or ecchymosis.  Mild to moderate swelling centered about the PIP joint of the long finger. No swan neck deformity.  Palpation- tender to palpation over the volar aspect of the long finger PIP joint  Neurovascular- intact light touch sensation and motor to distribution of the median, ulnar and radial nerves. Brisk capillary refill to the distal fingers. 2+ radial pulse.   Long finger range of Motion:   -Able to make nearly a full fist, with tip of long finger within 1 cm of the palmar crease  -PIP joint: 130 degrees of flexion  -No swan-necking  Stability: no dislocation, subluxation or laxity with radial or ulnar deviation stress at the PIP joint.  Muscle strength and tone: No atrophy, spasticity or flaccidness. 5/5 strength EPL, FPL, APB, first dorsal interosseous.    Tendon: FDS and FDP intact to all fingers. Normal tenodesis effect.            Imaging:   3 view X-ray of the right long finger was independently interpreted by me. The results were discussed with the patient.  Findings include:  -Small minimally displaced avulsion fracture of the long finger volar middle phalangeal base, seen best on oblique view.  PIP joint concentrically reduced otherwise.  -No additional appreciable osseous abnormalities.             Assessment and Plan:   Assessment:  61 year old RHD female with right long finger middle phalanx volar plate avulsion fracture sustained 5 to 6 weeks ago.  Nearly full range of motion and no evidence of swan-neck deformity on exam.     Plan:  -Given no consistent period of immobilization, recommended jenaro taping at all times for the next 3 weeks.  Jenaro taping straps were provided.  She may remove the jenaro tape for hygiene but should otherwise wear the jenaro taping straps full-time  -Recommended she limit strenuous physical activity with her right hand.  -Counseled that even after adequate  treatment, pain and swelling may not resolve until 6 months after injury.   -Follow-up 3 weeks for clinical recheck.  No x-rays needed      Bryanna Patterson MD  Department of Orthopedic Surgery  Hand Surgery            Again, thank you for allowing me to participate in the care of your patient.        Sincerely,        Bryanna Patterson MD

## 2021-02-10 ENCOUNTER — VIRTUAL VISIT (OUTPATIENT)
Dept: ORTHOPEDICS | Facility: CLINIC | Age: 61
End: 2021-02-10
Payer: COMMERCIAL

## 2021-02-10 DIAGNOSIS — S62.629A CLOSED AVULSION FRACTURE OF MIDDLE PHALANX OF FINGER, INITIAL ENCOUNTER: Primary | ICD-10-CM

## 2021-02-10 PROCEDURE — 99212 OFFICE O/P EST SF 10 MIN: CPT | Mod: 95 | Performed by: STUDENT IN AN ORGANIZED HEALTH CARE EDUCATION/TRAINING PROGRAM

## 2021-02-10 NOTE — LETTER
2/10/2021         RE: Celeste Alcala  [address]  [facility] [address]        Dear Colleague,    Thank you for referring your patient, Celeste Alcala, to the [facility] [place]. Please see a copy of my visit note below.    Cornelia is a 61 year old who is being evaluated via a billable telephone visit.      What phone number would you like to be contacted at? mobile  How would you like to obtain your AVS? MyChart  Phone call duration: 5 minutes    Hand Surgery History & Physical    REFERRING PHYSICIAN: No ref. provider found   PRIMARY CARE PHYSICIAN: Alicia Fisher           Chief Complaint:   Right long finger fracture    History of Present Illness:  Symptom Profile Including: location of symptoms, onset, severity, exacerbating/alleviating factors, previous treatments:        Celeste Alcala is a 61 year old RHD female who presents for evaluation of right long finger volar plate avulsion fracture.    Injury: On 12/5/2020 vs 12/12/2020 she was hanging [employer] decorations when she fell and hit her right hand on a banister.  She experienced pain on the volar aspect of her PIP joint.  Denies lala dislocation or need for manual reduction.  She did not initially seek medical attention.  She is now status post nonoperative treatment with 3 weeks of wearing jenaro straps with the index and middle finger buddied together.  She states that since our last clinic appointment she is experiencing decreased pain and decreased swelling although she still does not have full range of motion of the digit.             Physical Exam:   Vitals: There were no vitals taken for this visit.    Physical Exam Adult:  Limited due to video visit.     Decreased swelling of right middle finger. Near complete fist.            Imaging:   No imaging obtained today.              Assessment and Plan:   Assessment:  61 year old RHD female with right long finger middle phalanx volar plate avulsion fracture  sustained ~9 weeks ago now s/p 3 weeks of buddy strap wear with improvement in symptoms.      Plan:  -Instructed to wean out of buddy straps.   -Recommended she limit strenuous physical activity with her right hand until full ROM and swelling resolves. Wear buddy straps for heavier activities.   -Counseled that even after adequate treatment, pain and swelling may not resolve until 6 months after injury.   -Follow-up as needed.        Bryanna Patterson MD  Department of Orthopedic Surgery  Hand Surgery            Again, thank you for allowing me to participate in the care of your patient.        Sincerely,        Bryanna Patterson MD

## 2021-02-10 NOTE — PROGRESS NOTES
Cornelia is a 61 year old who is being evaluated via a billable telephone visit.      What phone number would you like to be contacted at? mobile  How would you like to obtain your AVS? Romero  Phone call duration: 5 minutes    Hand Surgery History & Physical    REFERRING PHYSICIAN: No ref. provider found   PRIMARY CARE PHYSICIAN: Alicia Fisher           Chief Complaint:   Right long finger fracture    History of Present Illness:  Symptom Profile Including: location of symptoms, onset, severity, exacerbating/alleviating factors, previous treatments:        Celeste Alcala is a 61 year old RHD female who presents for evaluation of right long finger volar plate avulsion fracture.    Injury: On 12/5/2020 vs 12/12/2020 she was hanging [employer] decorations when she fell and hit her right hand on a banister.  She experienced pain on the volar aspect of her PIP joint.  Denies lala dislocation or need for manual reduction.  She did not initially seek medical attention.  She is now status post nonoperative treatment with 3 weeks of wearing jenaro straps with the index and middle finger buddied together.  She states that since our last clinic appointment she is experiencing decreased pain and decreased swelling although she still does not have full range of motion of the digit.             Physical Exam:   Vitals: There were no vitals taken for this visit.    Physical Exam Adult:  Limited due to video visit.     Decreased swelling of right middle finger. Near complete fist.            Imaging:   No imaging obtained today.              Assessment and Plan:   Assessment:  61 year old RHD female with right long finger middle phalanx volar plate avulsion fracture sustained ~9 weeks ago now s/p 3 weeks of jenaro strap wear with improvement in symptoms.      Plan:  -Instructed to wean out of jenaro straps.   -Recommended she limit strenuous physical activity with her right hand until full ROM and swelling resolves. Wear jenaro straps  for heavier activities.   -Counseled that even after adequate treatment, pain and swelling may not resolve until 6 months after injury.   -Follow-up as needed.        Bryanna Patterson MD  Department of Orthopedic Surgery  Hand Surgery

## 2021-05-29 ENCOUNTER — HEALTH MAINTENANCE LETTER (OUTPATIENT)
Age: 61
End: 2021-05-29

## 2021-09-18 ENCOUNTER — HEALTH MAINTENANCE LETTER (OUTPATIENT)
Age: 61
End: 2021-09-18

## 2022-01-08 ENCOUNTER — HEALTH MAINTENANCE LETTER (OUTPATIENT)
Age: 62
End: 2022-01-08

## 2022-04-08 SDOH — ECONOMIC STABILITY: FOOD INSECURITY: WITHIN THE PAST 12 MONTHS, THE FOOD YOU BOUGHT JUST DIDN'T LAST AND YOU DIDN'T HAVE MONEY TO GET MORE.: NEVER TRUE

## 2022-04-08 SDOH — ECONOMIC STABILITY: INCOME INSECURITY: IN THE LAST 12 MONTHS, WAS THERE A TIME WHEN YOU WERE NOT ABLE TO PAY THE MORTGAGE OR RENT ON TIME?: NO

## 2022-04-08 SDOH — HEALTH STABILITY: PHYSICAL HEALTH: ON AVERAGE, HOW MANY MINUTES DO YOU ENGAGE IN EXERCISE AT THIS LEVEL?: 40 MIN

## 2022-04-08 SDOH — ECONOMIC STABILITY: INCOME INSECURITY: HOW HARD IS IT FOR YOU TO PAY FOR THE VERY BASICS LIKE FOOD, HOUSING, MEDICAL CARE, AND HEATING?: NOT HARD AT ALL

## 2022-04-08 SDOH — ECONOMIC STABILITY: FOOD INSECURITY: WITHIN THE PAST 12 MONTHS, YOU WORRIED THAT YOUR FOOD WOULD RUN OUT BEFORE YOU GOT MONEY TO BUY MORE.: NEVER TRUE

## 2022-04-08 SDOH — HEALTH STABILITY: PHYSICAL HEALTH: ON AVERAGE, HOW MANY DAYS PER WEEK DO YOU ENGAGE IN MODERATE TO STRENUOUS EXERCISE (LIKE A BRISK WALK)?: 5 DAYS

## 2022-04-08 SDOH — ECONOMIC STABILITY: TRANSPORTATION INSECURITY
IN THE PAST 12 MONTHS, HAS THE LACK OF TRANSPORTATION KEPT YOU FROM MEDICAL APPOINTMENTS OR FROM GETTING MEDICATIONS?: NO

## 2022-04-08 SDOH — ECONOMIC STABILITY: TRANSPORTATION INSECURITY
IN THE PAST 12 MONTHS, HAS LACK OF TRANSPORTATION KEPT YOU FROM MEETINGS, WORK, OR FROM GETTING THINGS NEEDED FOR DAILY LIVING?: NO

## 2022-04-08 ASSESSMENT — ENCOUNTER SYMPTOMS
COUGH: 0
ABDOMINAL PAIN: 0
HEMATOCHEZIA: 0
PALPITATIONS: 0
DIZZINESS: 0
ARTHRALGIAS: 0
CHILLS: 0
WEAKNESS: 0
MYALGIAS: 0
CONSTIPATION: 0
NERVOUS/ANXIOUS: 0
FREQUENCY: 0
FEVER: 0
DYSURIA: 0
JOINT SWELLING: 0
HEADACHES: 0
SHORTNESS OF BREATH: 0
HEMATURIA: 0
NAUSEA: 0
DIARRHEA: 0
PARESTHESIAS: 0
BREAST MASS: 0
EYE PAIN: 0
SORE THROAT: 0
HEARTBURN: 0

## 2022-04-08 ASSESSMENT — SOCIAL DETERMINANTS OF HEALTH (SDOH)
HOW OFTEN DO YOU GET TOGETHER WITH FRIENDS OR RELATIVES?: ONCE A WEEK
DO YOU BELONG TO ANY CLUBS OR ORGANIZATIONS SUCH AS CHURCH GROUPS UNIONS, FRATERNAL OR ATHLETIC GROUPS, OR SCHOOL GROUPS?: NO
HOW OFTEN DO YOU ATTEND CHURCH OR RELIGIOUS SERVICES?: NEVER
IN A TYPICAL WEEK, HOW MANY TIMES DO YOU TALK ON THE PHONE WITH FAMILY, FRIENDS, OR NEIGHBORS?: MORE THAN THREE TIMES A WEEK

## 2022-04-08 ASSESSMENT — LIFESTYLE VARIABLES
HOW OFTEN DO YOU HAVE A DRINK CONTAINING ALCOHOL: 2-3 TIMES A WEEK
HOW OFTEN DO YOU HAVE SIX OR MORE DRINKS ON ONE OCCASION: NEVER
HOW MANY STANDARD DRINKS CONTAINING ALCOHOL DO YOU HAVE ON A TYPICAL DAY: 1 OR 2

## 2022-04-11 ENCOUNTER — OFFICE VISIT (OUTPATIENT)
Dept: PEDIATRICS | Facility: CLINIC | Age: 62
End: 2022-04-11
Payer: COMMERCIAL

## 2022-04-11 VITALS
HEART RATE: 93 BPM | BODY MASS INDEX: 22.54 KG/M2 | TEMPERATURE: 97.6 F | WEIGHT: 143.6 LBS | OXYGEN SATURATION: 97 % | SYSTOLIC BLOOD PRESSURE: 108 MMHG | HEIGHT: 67 IN | RESPIRATION RATE: 20 BRPM | DIASTOLIC BLOOD PRESSURE: 71 MMHG

## 2022-04-11 DIAGNOSIS — R42 VERTIGO: ICD-10-CM

## 2022-04-11 DIAGNOSIS — Z00.00 ENCOUNTER FOR ROUTINE ADULT HEALTH EXAMINATION WITHOUT ABNORMAL FINDINGS: Primary | ICD-10-CM

## 2022-04-11 DIAGNOSIS — R09.81 NASAL CONGESTION: ICD-10-CM

## 2022-04-11 DIAGNOSIS — Z12.11 SCREEN FOR COLON CANCER: ICD-10-CM

## 2022-04-11 DIAGNOSIS — Z12.31 VISIT FOR SCREENING MAMMOGRAM: ICD-10-CM

## 2022-04-11 PROCEDURE — 36415 COLL VENOUS BLD VENIPUNCTURE: CPT | Performed by: INTERNAL MEDICINE

## 2022-04-11 PROCEDURE — 80061 LIPID PANEL: CPT | Performed by: INTERNAL MEDICINE

## 2022-04-11 PROCEDURE — 0064A COVID-19,PF,MODERNA (18+ YRS BOOSTER .25ML): CPT | Performed by: INTERNAL MEDICINE

## 2022-04-11 PROCEDURE — 99396 PREV VISIT EST AGE 40-64: CPT | Mod: 25 | Performed by: INTERNAL MEDICINE

## 2022-04-11 PROCEDURE — 80053 COMPREHEN METABOLIC PANEL: CPT | Performed by: INTERNAL MEDICINE

## 2022-04-11 PROCEDURE — 91306 COVID-19,PF,MODERNA (18+ YRS BOOSTER .25ML): CPT | Performed by: INTERNAL MEDICINE

## 2022-04-11 RX ORDER — MECLIZINE HYDROCHLORIDE 25 MG/1
25 TABLET ORAL 3 TIMES DAILY PRN
Qty: 30 TABLET | Refills: 0 | Status: SHIPPED | OUTPATIENT
Start: 2022-04-11 | End: 2023-07-25

## 2022-04-11 RX ORDER — ONDANSETRON 4 MG/1
4-8 TABLET, ORALLY DISINTEGRATING ORAL EVERY 8 HOURS PRN
Qty: 10 TABLET | Refills: 1 | Status: SHIPPED | OUTPATIENT
Start: 2022-04-11 | End: 2023-07-25

## 2022-04-11 RX ORDER — FLUTICASONE PROPIONATE 50 MCG
1-2 SPRAY, SUSPENSION (ML) NASAL DAILY
Qty: 16 G | Refills: 11 | Status: SHIPPED | OUTPATIENT
Start: 2022-04-11 | End: 2023-06-13

## 2022-04-11 ASSESSMENT — ENCOUNTER SYMPTOMS
HEMATURIA: 0
HEMATOCHEZIA: 0
EYE PAIN: 0
WEAKNESS: 0
DIARRHEA: 0
DYSURIA: 0
FREQUENCY: 0
SORE THROAT: 0
PARESTHESIAS: 0
HEARTBURN: 0
JOINT SWELLING: 0
ARTHRALGIAS: 0
MYALGIAS: 0
BREAST MASS: 0
PALPITATIONS: 0
CONSTIPATION: 0
FEVER: 0
HEADACHES: 0
ABDOMINAL PAIN: 0
SHORTNESS OF BREATH: 0
DIZZINESS: 0
NAUSEA: 0
COUGH: 0
CHILLS: 0
NERVOUS/ANXIOUS: 0

## 2022-04-11 ASSESSMENT — PAIN SCALES - GENERAL: PAINLEVEL: MODERATE PAIN (4)

## 2022-04-11 NOTE — PROGRESS NOTES
SUBJECTIVE:   CC: Celeste Alcala is an 62 year old woman who presents for preventive health visit.       Patient has been advised of split billing requirements and indicates understanding: Yes  Healthy Habits:     Getting at least 3 servings of Calcium per day:  NO    Bi-annual eye exam:  Yes    Dental care twice a year:  Yes    Sleep apnea or symptoms of sleep apnea:  None    Diet:  Regular (no restrictions)    Frequency of exercise:  4-5 days/week    Duration of exercise:  30-45 minutes    Taking medications regularly:  Yes    Medication side effects:  None    PHQ-2 Total Score: 0    Additional concerns today:  No      Occasionally gets left lateral inguinal panel.              Today's PHQ-2 Score:   PHQ-2 ( 1999 Pfizer) 4/8/2022   Q1: Little interest or pleasure in doing things 0   Q2: Feeling down, depressed or hopeless 0   PHQ-2 Score 0   PHQ-2 Total Score (12-17 Years)- Positive if 3 or more points; Administer PHQ-A if positive -   Q1: Little interest or pleasure in doing things Not at all   Q2: Feeling down, depressed or hopeless Not at all   PHQ-2 Score 0       Abuse: Current or Past (Physical, Sexual or Emotional) - No  Do you feel safe in your environment? Yes        Social History     Tobacco Use     Smoking status: Never Smoker     Smokeless tobacco: Never Used   Substance Use Topics     Alcohol use: Yes     Comment: few drinks per night on weekend     If you drink alcohol do you typically have >3 drinks per day or >7 drinks per week? No    Alcohol Use 4/8/2022   Prescreen: >3 drinks/day or >7 drinks/week? Yes   Prescreen: >3 drinks/day or >7 drinks/week? -   AUDIT SCORE  4       Reviewed orders with patient.  Reviewed health maintenance and updated orders accordingly - Yes  Lab work is in process  Labs reviewed in EPIC  BP Readings from Last 3 Encounters:   04/11/22 108/71   01/20/21 109/78   01/19/21 126/76    Wt Readings from Last 3 Encounters:   04/11/22 65.1 kg (143 lb 9.6 oz)   01/20/21 65.8  kg (145 lb)   21 65.8 kg (145 lb)                  Patient Active Problem List   Diagnosis     Basal cell carcinoma of auricle of ear     Past Surgical History:   Procedure Laterality Date     BIOPSY      melanoma biopsy     COLONOSCOPY  2012    Procedure:COLONOSCOPY;  Colonoscopy   ; Surgeon:BRIAN NAGEL; Location: GI     ENT SURGERY      tonsils     ZZC NONSPECIFIC PROCEDURE      (R) knee surgery-leg length discrepency     ZZC NONSPECIFIC PROCEDURE      T&A     ZZC NONSPECIFIC PROCEDURE       x 2       Social History     Tobacco Use     Smoking status: Never Smoker     Smokeless tobacco: Never Used   Substance Use Topics     Alcohol use: Yes     Comment: few drinks per night on weekend     Family History   Problem Relation Age of Onset     Thyroid Disease Mother         graves     Musculoskeletal Disorder Sister         MS     Other Cancer Sister          Current Outpatient Medications   Medication Sig Dispense Refill     CALCIUM CARBONATE PO        fluticasone (FLONASE) 50 MCG/ACT nasal spray Spray 1-2 sprays into both nostrils in the morning. 16 g 11     meclizine (ANTIVERT) 25 MG tablet Take 1 tablet (25 mg) by mouth 3 times daily as needed for dizziness 30 tablet 0     multivitamin, therapeutic with minerals (MULTI-VITAMIN) TABS tablet Take 1 tablet by mouth daily       ondansetron (ZOFRAN-ODT) 4 MG ODT tab Take 1-2 tablets (4-8 mg) by mouth every 8 hours as needed for nausea 10 tablet 1     Allergies   Allergen Reactions     No Known Drug Allergies        Breast Cancer Screening:    FHS-7:   Breast CA Risk Assessment (FHS-7) 2022   Did any of your first-degree relatives have breast or ovarian cancer? No   Did any of your relatives have bilateral breast cancer? No   Did any man in your family have breast cancer? No   Did any woman in your family have breast and ovarian cancer? Yes   Did any woman in your family have breast cancer before age 50 y? No   Do you have 2 or more  relatives with breast and/or ovarian cancer? No   Do you have 2 or more relatives with breast and/or bowel cancer? No     click delete button to remove this line now    Pertinent mammograms are reviewed under the imaging tab.    History of abnormal Pap smear: NO - age 30-65 PAP every 5 years with negative HPV co-testing recommended  PAP / HPV Latest Ref Rng & Units 4/3/2018 2013 2010   PAP (Historical) - NIL NIL NIL   HPV16 NEG:Negative Negative - -   HPV18 NEG:Negative Negative - -   HRHPV NEG:Negative Negative - -     Reviewed and updated as needed this visit by clinical staff   Tobacco  Allergies    Med Hx              Reviewed and updated as needed this visit by Provider                     Past Medical History:   Diagnosis Date     Unspecified sinusitis (chronic)       Past Surgical History:   Procedure Laterality Date     BIOPSY      melanoma biopsy     COLONOSCOPY  2012    Procedure:COLONOSCOPY;  Colonoscopy   ; Surgeon:BRIAN NAGEL; Location: GI     ENT SURGERY      tonsils     [facility] NONSPECIFIC PROCEDURE      (R) knee surgery-leg length discrepency     Z NONSPECIFIC PROCEDURE      T&A     Z NONSPECIFIC PROCEDURE       x 2       Review of Systems   Constitutional: Negative for chills and fever.   HENT: Negative for congestion, ear pain, hearing loss and sore throat.    Eyes: Negative for pain and visual disturbance.   Respiratory: Negative for cough and shortness of breath.    Cardiovascular: Negative for chest pain, palpitations and peripheral edema.   Gastrointestinal: Negative for abdominal pain, constipation, diarrhea, heartburn, hematochezia and nausea.   Breasts:  Negative for tenderness, breast mass and discharge.   Genitourinary: Negative for dysuria, frequency, genital sores, hematuria, pelvic pain, urgency, vaginal bleeding and vaginal discharge.   Musculoskeletal: Negative for arthralgias, joint swelling and myalgias.   Skin: Negative for rash.   Neurological:  "Negative for dizziness, weakness, headaches and paresthesias.   Psychiatric/Behavioral: Negative for mood changes. The patient is not nervous/anxious.      CONSTITUTIONAL: NEGATIVE for fever, chills, change in weight  INTEGUMENTARY/SKIN: NEGATIVE for worrisome rashes, moles or lesions  EYES: NEGATIVE for vision changes or irritation  ENT: NEGATIVE for ear, mouth and throat problems  RESP: NEGATIVE for significant cough or SOB  BREAST: NEGATIVE for masses, tenderness or discharge  CV: NEGATIVE for chest pain, palpitations or peripheral edema  GI: NEGATIVE for nausea, abdominal pain, heartburn, or change in bowel habits  : NEGATIVE for unusual urinary or vaginal symptoms. No vaginal bleeding.  MUSCULOSKELETAL: NEGATIVE for significant arthralgias or myalgia  NEURO: NEGATIVE for weakness, dizziness or paresthesias  PSYCHIATRIC: NEGATIVE for changes in mood or affect      OBJECTIVE:   /71   Pulse 93   Temp 97.6  F (36.4  C) (Tympanic)   Resp 20   Ht 1.705 m (5' 7.13\")   Wt 65.1 kg (143 lb 9.6 oz)   SpO2 97%   BMI 22.41 kg/m    Physical Exam  GENERAL: healthy, alert and no distress  EYES: Eyes grossly normal to inspection, PERRL and conjunctivae and sclerae normal  HENT: ear canals and TM's normal, nose and mouth without ulcers or lesions  NECK: no adenopathy, no asymmetry, masses, or scars and thyroid normal to palpation  RESP: lungs clear to auscultation - no rales, rhonchi or wheezes  CV: regular rate and rhythm, normal S1 S2, no S3 or S4, no murmur, click or rub, no peripheral edema and peripheral pulses strong  ABDOMEN: soft, nontender, no hepatosplenomegaly, no masses and bowel sounds normal  Standing exam:  No evidence of inguinal hernia on valsalva.   MS: no gross musculoskeletal defects noted, no edema  SKIN: no suspicious lesions or rashes  NEURO: Normal strength and tone, mentation intact and speech normal  PSYCH: mentation appears normal, affect normal/bright    Diagnostic Test Results:  Labs " "reviewed in Epic    ASSESSMENT/PLAN:   (Z00.00) Encounter for routine adult health examination without abnormal findings  (primary encounter diagnosis)  Comment:   Plan: COVID-19,PF,MODERNA (18+ YRS BOOSTER .25ML),         Comprehensive metabolic panel (BMP + Alb, Alk         Phos, ALT, AST, Total. Bili, TP), Lipid panel         reflex to direct LDL Fasting        Discussed diet, exercise, breast self exam, blood pressure, cholesterol, calcium supplementation and osteoporosis, and need for cancer surveillance at appropriate ages.     (Z12.31) Visit for screening mammogram  Comment:   Plan: MA SCREENING DIGITAL BILAT - Future  (s+30)            (Z12.11) Screen for colon cancer  Comment:   Plan:     (R42) Vertigo  Comment:   Plan: meclizine (ANTIVERT) 25 MG tablet, ondansetron         (ZOFRAN-ODT) 4 MG ODT tab        Refilled for intermittent use.     (R09.81) Nasal congestion  Comment:   Plan: fluticasone (FLONASE) 50 MCG/ACT nasal spray              Patient has been advised of split billing requirements and indicates understanding: Yes    COUNSELING:  Reviewed preventive health counseling, as reflected in patient instructions       Healthy diet/nutrition       Vision screening       Hearing screening    Estimated body mass index is 22.41 kg/m  as calculated from the following:    Height as of this encounter: 1.705 m (5' 7.13\").    Weight as of this encounter: 65.1 kg (143 lb 9.6 oz).        She reports that she has never smoked. She has never used smokeless tobacco.      Counseling Resources:  ATP IV Guidelines  Pooled Cohorts Equation Calculator  Breast Cancer Risk Calculator  BRCA-Related Cancer Risk Assessment: FHS-7 Tool  FRAX Risk Assessment  ICSI Preventive Guidelines  Dietary Guidelines for Americans, 2010  USDA's MyPlate  ASA Prophylaxis  Lung CA Screening    Wilner Gomez MD  [facility]  "

## 2022-04-11 NOTE — PATIENT INSTRUCTIONS
Pap smear next year.    Mammogram: you can call our clinic at  to set this up, or stop at our desk on the way out.  Or, you can call [facility] at  (business hours) or  (24 hours) to set up your mammogram.  If they have not heard from you, they may call you directly.     Colonscopy this year.    Refilled labs.    COVID booster #2 today.    Calcium 500 twice daily and vitamin D 1000 units daily.

## 2022-04-12 LAB
ALBUMIN SERPL-MCNC: 4 G/DL (ref 3.4–5)
ALP SERPL-CCNC: 88 U/L (ref 40–150)
ALT SERPL W P-5'-P-CCNC: 24 U/L (ref 0–50)
ANION GAP SERPL CALCULATED.3IONS-SCNC: 3 MMOL/L (ref 3–14)
AST SERPL W P-5'-P-CCNC: 15 U/L (ref 0–45)
BILIRUB SERPL-MCNC: 0.3 MG/DL (ref 0.2–1.3)
BUN SERPL-MCNC: 17 MG/DL (ref 7–30)
CALCIUM SERPL-MCNC: 9.1 MG/DL (ref 8.5–10.1)
CHLORIDE BLD-SCNC: 108 MMOL/L (ref 94–109)
CHOLEST SERPL-MCNC: 175 MG/DL
CO2 SERPL-SCNC: 30 MMOL/L (ref 20–32)
CREAT SERPL-MCNC: 0.78 MG/DL (ref 0.52–1.04)
FASTING STATUS PATIENT QL REPORTED: YES
GFR SERPL CREATININE-BSD FRML MDRD: 86 ML/MIN/1.73M2
GLUCOSE BLD-MCNC: 125 MG/DL (ref 70–99)
HDLC SERPL-MCNC: 54 MG/DL
LDLC SERPL CALC-MCNC: 73 MG/DL
NONHDLC SERPL-MCNC: 121 MG/DL
POTASSIUM BLD-SCNC: 4 MMOL/L (ref 3.4–5.3)
PROT SERPL-MCNC: 7 G/DL (ref 6.8–8.8)
SODIUM SERPL-SCNC: 141 MMOL/L (ref 133–144)
TRIGL SERPL-MCNC: 242 MG/DL

## 2022-04-13 ENCOUNTER — ANCILLARY PROCEDURE (OUTPATIENT)
Dept: MAMMOGRAPHY | Facility: CLINIC | Age: 62
End: 2022-04-13
Attending: INTERNAL MEDICINE
Payer: COMMERCIAL

## 2022-04-13 DIAGNOSIS — Z12.31 VISIT FOR SCREENING MAMMOGRAM: ICD-10-CM

## 2022-04-13 PROCEDURE — 77067 SCR MAMMO BI INCL CAD: CPT | Mod: TC | Performed by: RADIOLOGY

## 2022-11-20 ENCOUNTER — HEALTH MAINTENANCE LETTER (OUTPATIENT)
Age: 62
End: 2022-11-20

## 2023-04-20 ENCOUNTER — PATIENT OUTREACH (OUTPATIENT)
Dept: CARE COORDINATION | Facility: CLINIC | Age: 63
End: 2023-04-20
Payer: COMMERCIAL

## 2023-05-31 ENCOUNTER — ANCILLARY PROCEDURE (OUTPATIENT)
Dept: MAMMOGRAPHY | Facility: CLINIC | Age: 63
End: 2023-05-31
Attending: INTERNAL MEDICINE
Payer: COMMERCIAL

## 2023-05-31 DIAGNOSIS — Z12.31 VISIT FOR SCREENING MAMMOGRAM: ICD-10-CM

## 2023-05-31 PROCEDURE — 77063 BREAST TOMOSYNTHESIS BI: CPT | Mod: TC | Performed by: RADIOLOGY

## 2023-05-31 PROCEDURE — 77067 SCR MAMMO BI INCL CAD: CPT | Mod: TC | Performed by: RADIOLOGY

## 2023-06-01 ENCOUNTER — HEALTH MAINTENANCE LETTER (OUTPATIENT)
Age: 63
End: 2023-06-01

## 2023-06-10 DIAGNOSIS — R09.81 NASAL CONGESTION: ICD-10-CM

## 2023-06-13 DIAGNOSIS — R09.81 NASAL CONGESTION: ICD-10-CM

## 2023-06-13 RX ORDER — FLUTICASONE PROPIONATE 50 MCG
SPRAY, SUSPENSION (ML) NASAL
Qty: 16 G | Refills: 11 | OUTPATIENT
Start: 2023-06-13

## 2023-06-13 RX ORDER — FLUTICASONE PROPIONATE 50 MCG
SPRAY, SUSPENSION (ML) NASAL
Qty: 16 G | Refills: 0 | Status: SHIPPED | OUTPATIENT
Start: 2023-06-13 | End: 2023-07-25

## 2023-06-13 NOTE — TELEPHONE ENCOUNTER
Ashley refill x 1. Needs an appointment to address further refills.     Keiko MARTINEZ RN   PAL (Patient Advocate Liaison)  [facility]  [phone]

## 2023-06-15 RX ORDER — FLUTICASONE PROPIONATE 50 MCG
SPRAY, SUSPENSION (ML) NASAL
Qty: 48 G | Refills: 0 | OUTPATIENT
Start: 2023-06-15

## 2023-06-15 NOTE — TELEPHONE ENCOUNTER
Is overdue for appointment.  Got outreach in April telling her to schedule and has not. Once scheduled, will do refill to get to appointment.  Please call her to schedule

## 2023-06-15 NOTE — TELEPHONE ENCOUNTER
Routing refill request to provider for review/approval because:  Pt requesting 90 days supply  Renea Guido RN, BSN  [facility]

## 2023-06-19 ENCOUNTER — OFFICE VISIT (OUTPATIENT)
Dept: URGENT CARE | Facility: URGENT CARE | Age: 63
End: 2023-06-19
Payer: COMMERCIAL

## 2023-06-19 VITALS
TEMPERATURE: 99.2 F | SYSTOLIC BLOOD PRESSURE: 123 MMHG | WEIGHT: 140.3 LBS | OXYGEN SATURATION: 98 % | BODY MASS INDEX: 21.89 KG/M2 | HEART RATE: 75 BPM | DIASTOLIC BLOOD PRESSURE: 80 MMHG

## 2023-06-19 DIAGNOSIS — R30.0 DYSURIA: ICD-10-CM

## 2023-06-19 DIAGNOSIS — R10.2 PELVIC PRESSURE IN FEMALE: Primary | ICD-10-CM

## 2023-06-19 LAB
ALBUMIN UR-MCNC: NEGATIVE MG/DL
APPEARANCE UR: CLEAR
BACTERIA #/AREA URNS HPF: ABNORMAL /HPF
BASOPHILS # BLD AUTO: 0 10E3/UL (ref 0–0.2)
BASOPHILS NFR BLD AUTO: 0 %
BILIRUB UR QL STRIP: NEGATIVE
CLUE CELLS: ABNORMAL
COLOR UR AUTO: YELLOW
EOSINOPHIL # BLD AUTO: 0.1 10E3/UL (ref 0–0.7)
EOSINOPHIL NFR BLD AUTO: 2 %
ERYTHROCYTE [DISTWIDTH] IN BLOOD BY AUTOMATED COUNT: 12.8 % (ref 10–15)
GLUCOSE UR STRIP-MCNC: NEGATIVE MG/DL
HCT VFR BLD AUTO: 37.7 % (ref 35–47)
HGB BLD-MCNC: 12 G/DL (ref 11.7–15.7)
HGB UR QL STRIP: ABNORMAL
IMM GRANULOCYTES # BLD: 0 10E3/UL
IMM GRANULOCYTES NFR BLD: 0 %
KETONES UR STRIP-MCNC: 15 MG/DL
LEUKOCYTE ESTERASE UR QL STRIP: ABNORMAL
LYMPHOCYTES # BLD AUTO: 1.9 10E3/UL (ref 0.8–5.3)
LYMPHOCYTES NFR BLD AUTO: 30 %
MCH RBC QN AUTO: 29 PG (ref 26.5–33)
MCHC RBC AUTO-ENTMCNC: 31.8 G/DL (ref 31.5–36.5)
MCV RBC AUTO: 91 FL (ref 78–100)
MONOCYTES # BLD AUTO: 0.4 10E3/UL (ref 0–1.3)
MONOCYTES NFR BLD AUTO: 6 %
MUCOUS THREADS #/AREA URNS LPF: PRESENT /LPF
NEUTROPHILS # BLD AUTO: 4 10E3/UL (ref 1.6–8.3)
NEUTROPHILS NFR BLD AUTO: 62 %
NITRATE UR QL: NEGATIVE
PH UR STRIP: 5.5 [PH] (ref 5–7)
PLATELET # BLD AUTO: 248 10E3/UL (ref 150–450)
RBC # BLD AUTO: 4.14 10E6/UL (ref 3.8–5.2)
RBC #/AREA URNS AUTO: ABNORMAL /HPF
SP GR UR STRIP: 1.02 (ref 1–1.03)
SQUAMOUS #/AREA URNS AUTO: ABNORMAL /LPF
TRICHOMONAS, WET PREP: ABNORMAL
UROBILINOGEN UR STRIP-ACNC: 0.2 E.U./DL
WBC # BLD AUTO: 6.4 10E3/UL (ref 4–11)
WBC #/AREA URNS AUTO: ABNORMAL /HPF
WBC'S/HIGH POWER FIELD, WET PREP: ABNORMAL
YEAST, WET PREP: ABNORMAL

## 2023-06-19 PROCEDURE — 85025 COMPLETE CBC W/AUTO DIFF WBC: CPT | Performed by: NURSE PRACTITIONER

## 2023-06-19 PROCEDURE — 99214 OFFICE O/P EST MOD 30 MIN: CPT | Performed by: NURSE PRACTITIONER

## 2023-06-19 PROCEDURE — 81001 URINALYSIS AUTO W/SCOPE: CPT | Performed by: NURSE PRACTITIONER

## 2023-06-19 PROCEDURE — 87210 SMEAR WET MOUNT SALINE/INK: CPT | Performed by: NURSE PRACTITIONER

## 2023-06-19 PROCEDURE — 80048 BASIC METABOLIC PNL TOTAL CA: CPT | Performed by: NURSE PRACTITIONER

## 2023-06-19 PROCEDURE — 87086 URINE CULTURE/COLONY COUNT: CPT | Performed by: NURSE PRACTITIONER

## 2023-06-19 PROCEDURE — 36415 COLL VENOUS BLD VENIPUNCTURE: CPT | Performed by: NURSE PRACTITIONER

## 2023-06-19 RX ORDER — HALOBETASOL PROPIONATE 0.5 MG/G
CREAM TOPICAL
COMMUNITY
Start: 2023-06-12 | End: 2023-07-25

## 2023-06-19 NOTE — PROGRESS NOTES
Chief Complaint   Patient presents with     UTI     6/15/23 sx feels rentention, painful and discomfort, slight pressure tx      SUBJECTIVE:  Celeste Alcala is a 63 year old female who presents today for symptoms of pelvic pressure, urinary retention, and pain. Patient has been experiencing these symptoms since Thursday (4 days ago).  She was started empirically on Omnicef on 06/15 on a virtual visit.  Was advised by her daughter to still be seen in person.  She denies any change in urine odor, color, gross hematuria, nausea, vomiting, vaginal bleeding, discharge and any ingestion of bladder irritants such as coffee, alcohol, chocolate, tea.  No concerns for STDs she is monogamous with her .  She endorses a fever over the weekend with the highest reading of 102.8, possible sick exposures from her grandkids. Denies any new soaps, increase in baths, detergents. She also states it hurts worse when she walks compared to when she sits or lays down. No urogyn surgeries or history of cyst, endometriosis, prolapse.  Normal BM yesterday.    Past Medical History:   Diagnosis Date     Unspecified sinusitis (chronic)      Current Outpatient Medications   Medication Sig Dispense Refill     CALCIUM CARBONATE PO        cefdinir (OMNICEF) 300 MG capsule Take 1 capsule (300 mg) by mouth 2 times daily for 7 days 14 capsule 0     fluticasone (FLONASE) 50 MCG/ACT nasal spray SHAKE LIQUID AND USE 1 TO 2 SPRAYS IN EACH NOSTRIL IN THE MORNING 16 g 0     halobetasol (ULTRAVATE) 0.05 % external cream        multivitamin, therapeutic with minerals (MULTI-VITAMIN) TABS tablet Take 1 tablet by mouth daily       Polypodium Leucotomos (HELIOCARE PO)        UNABLE TO FIND MEDICATION NAME: Lovastatin topical cream       meclizine (ANTIVERT) 25 MG tablet Take 1 tablet (25 mg) by mouth 3 times daily as needed for dizziness (Patient not taking: Reported on 6/19/2023) 30 tablet 0     ondansetron (ZOFRAN-ODT) 4 MG ODT tab Take 1-2 tablets  (4-8 mg) by mouth every 8 hours as needed for nausea (Patient not taking: Reported on 6/19/2023) 10 tablet 1     Social History     Tobacco Use     Smoking status: Never     Smokeless tobacco: Never   Vaping Use     Vaping status: Never Used   Substance Use Topics     Alcohol use: Yes     Comment: few drinks per night on weekend     Allergies   Allergen Reactions     No Known Drug Allergy        Review of Systems   All system negative unless specified in HPI.    OBJECTIVE:  /80   Pulse 75   Temp 99.2  F (37.3  C)   Wt 63.6 kg (140 lb 4.8 oz)   SpO2 98%   BMI 21.89 kg/m       Physical Exam  Vitals reviewed.   Constitutional:       General: She is not in acute distress.     Appearance: Normal appearance. She is not diaphoretic.   HENT:      Head: Normocephalic and atraumatic.   Cardiovascular:      Rate and Rhythm: Normal rate.   Pulmonary:      Effort: Pulmonary effort is normal. No respiratory distress.      Breath sounds: No stridor.   Abdominal:      Tenderness: There is no right CVA tenderness or left CVA tenderness.   Genitourinary:     Vagina: No vaginal discharge.   Musculoskeletal:         General: Normal range of motion.      Cervical back: Normal range of motion.   Skin:     General: Skin is warm and dry.   Neurological:      General: No focal deficit present.      Mental Status: She is alert and oriented to person, place, and time.   Psychiatric:         Mood and Affect: Mood normal.         Behavior: Behavior normal.       Results for orders placed or performed in visit on 06/19/23   UA Macroscopic with reflex to Microscopic and Culture     Status: Abnormal    Specimen: Urine, Clean Catch   Result Value Ref Range    Color Urine Yellow Colorless, Straw, Light Yellow, Yellow    Appearance Urine Clear Clear    Glucose Urine Negative Negative mg/dL    Bilirubin Urine Negative Negative    Ketones Urine 15 (A) Negative mg/dL    Specific Gravity Urine 1.020 1.003 - 1.035    Blood Urine Large (A)  Negative    pH Urine 5.5 5.0 - 7.0    Protein Albumin Urine Negative Negative mg/dL    Urobilinogen Urine 0.2 0.2, 1.0 E.U./dL    Nitrite Urine Negative Negative    Leukocyte Esterase Urine Trace (A) Negative   UA Microscopic with Reflex to Culture     Status: Abnormal   Result Value Ref Range    Bacteria Urine Few (A) None Seen /HPF    RBC Urine 5-10 (A) 0-2 /HPF /HPF    WBC Urine 0-5 0-5 /HPF /HPF    Squamous Epithelials Urine Few (A) None Seen /LPF    Mucus Urine Present (A) None Seen /LPF    Narrative    Urine Culture not indicated   CBC with platelets and differential     Status: None   Result Value Ref Range    WBC Count 6.4 4.0 - 11.0 10e3/uL    RBC Count 4.14 3.80 - 5.20 10e6/uL    Hemoglobin 12.0 11.7 - 15.7 g/dL    Hematocrit 37.7 35.0 - 47.0 %    MCV 91 78 - 100 fL    MCH 29.0 26.5 - 33.0 pg    MCHC 31.8 31.5 - 36.5 g/dL    RDW 12.8 10.0 - 15.0 %    Platelet Count 248 150 - 450 10e3/uL    % Neutrophils 62 %    % Lymphocytes 30 %    % Monocytes 6 %    % Eosinophils 2 %    % Basophils 0 %    % Immature Granulocytes 0 %    Absolute Neutrophils 4.0 1.6 - 8.3 10e3/uL    Absolute Lymphocytes 1.9 0.8 - 5.3 10e3/uL    Absolute Monocytes 0.4 0.0 - 1.3 10e3/uL    Absolute Eosinophils 0.1 0.0 - 0.7 10e3/uL    Absolute Basophils 0.0 0.0 - 0.2 10e3/uL    Absolute Immature Granulocytes 0.0 <=0.4 10e3/uL   Wet prep - Clinic Collect     Status: Abnormal    Specimen: Vagina; Swab   Result Value Ref Range    Trichomonas Absent Absent    Yeast Absent Absent    Clue Cells Absent Absent    WBCs/high power field 2+ (A) None   CBC with platelets and differential     Status: None    Narrative    The following orders were created for panel order CBC with platelets and differential.  Procedure                               Abnormality         Status                     ---------                               -----------         ------                     CBC with platelets and d...[[id number]]                      Final result                  Please view results for these tests on the individual orders.     ASSESSMENT:    ICD-10-CM    1. Pelvic pressure in female  R10.2 Wet prep - Clinic Collect     CBC with platelets and differential     Basic metabolic panel  (Ca, Cl, CO2, Creat, Gluc, K, Na, BUN)     CBC with platelets and differential     Basic metabolic panel  (Ca, Cl, CO2, Creat, Gluc, K, Na, BUN)      2. Dysuria  R30.0 UA Macroscopic with reflex to Microscopic and Culture     UA Macroscopic with reflex to Microscopic and Culture     UA Microscopic with Reflex to Culture     Urine Culture Aerobic Bacterial - lab collect     Urine Culture Aerobic Bacterial - lab collect     CANCELED: Wet preparation        PLAN:    Take full course of antibiotics, finish omnicef.  Urine culture pending, we will call you only if culture shows resistance and change of antibiotic is required or if no growth to stop antibiotics and to follow-up with your primary care provider.  CBC normal, no overwhelming infection like pyelonephritis  BMP pending, will call if kidney dysfunction  Wet prep normal  May use over the counter AZO pain relief or Uristat (phenazopyridine) for urinary burning but do not use for 24 hours before future urine tests.  Drink plenty of fluids. Limit caffeine and alcohol as these are bladder irritants.  May take tylenol or ibuprofen as needed for discomfort.   If you develop any vomiting, high fevers or lower back pain, these can be signs of a kidney infection and you should be seen in urgent care or in the ER.  Prevention of future infections by drinking cranberry juice, urination after intercourse and wiping from front to back after using the toilet.  Please follow up with primary care provider if symptoms return, if you're not improving, worsening or new symptoms or for any adverse reactions to medications.   Urogyn in the future if lingering or worsening, discussed anatomical changes with estrogen atrophy prolapse etc    Missy  Steffany, [facility]

## 2023-06-19 NOTE — PATIENT INSTRUCTIONS
Take full course of antibiotics.  Urine culture pending, we will call you only if culture shows resistance and change of antibiotic is required or if no growth to stop antibiotics and to follow-up with your primary care provider.  CBC normal, no overwhelming infection like pyelonephritis  BMP pending, will call if kidney dysfunction  Wet prep normal  May use over the counter AZO pain relief or Uristat (phenazopyridine) for urinary burning but do not use for 24 hours before future urine tests.  Drink plenty of fluids. Limit caffeine and alcohol as these are bladder irritants.  May take tylenol or ibuprofen as needed for discomfort.   If you develop any vomiting, high fevers or lower back pain, these can be signs of a kidney infection and you should be seen in urgent care or in the ER.  Prevention of future infections by drinking cranberry juice, urination after intercourse and wiping from front to back after using the toilet.  Please follow up with primary care provider if symptoms return, if you're not improving, worsening or new symptoms or for any adverse reactions to medications.   Urogyn in the future if lingering or worsening, discussed anatomical changes with estrogen atrophy prolapse etc

## 2023-06-20 LAB
ANION GAP SERPL CALCULATED.3IONS-SCNC: 11 MMOL/L (ref 7–15)
BUN SERPL-MCNC: 14.6 MG/DL (ref 8–23)
CALCIUM SERPL-MCNC: 9.2 MG/DL (ref 8.8–10.2)
CHLORIDE SERPL-SCNC: 102 MMOL/L (ref 98–107)
CREAT SERPL-MCNC: 0.64 MG/DL (ref 0.51–0.95)
DEPRECATED HCO3 PLAS-SCNC: 27 MMOL/L (ref 22–29)
GFR SERPL CREATININE-BSD FRML MDRD: >90 ML/MIN/1.73M2
GLUCOSE SERPL-MCNC: 95 MG/DL (ref 70–99)
POTASSIUM SERPL-SCNC: 4 MMOL/L (ref 3.4–5.3)
SODIUM SERPL-SCNC: 140 MMOL/L (ref 136–145)

## 2023-06-20 NOTE — PROGRESS NOTES
Luz [place], [employer] student participated in this visit per patient's approval.  I personally performed my own history physical exam and treatment plan as noted below.

## 2023-06-21 LAB — BACTERIA UR CULT: NO GROWTH

## 2023-07-20 SDOH — ECONOMIC STABILITY: FOOD INSECURITY: WITHIN THE PAST 12 MONTHS, YOU WORRIED THAT YOUR FOOD WOULD RUN OUT BEFORE YOU GOT MONEY TO BUY MORE.: NEVER TRUE

## 2023-07-20 SDOH — ECONOMIC STABILITY: INCOME INSECURITY: IN THE LAST 12 MONTHS, WAS THERE A TIME WHEN YOU WERE NOT ABLE TO PAY THE MORTGAGE OR RENT ON TIME?: NO

## 2023-07-20 SDOH — HEALTH STABILITY: PHYSICAL HEALTH: ON AVERAGE, HOW MANY DAYS PER WEEK DO YOU ENGAGE IN MODERATE TO STRENUOUS EXERCISE (LIKE A BRISK WALK)?: 5 DAYS

## 2023-07-20 SDOH — ECONOMIC STABILITY: INCOME INSECURITY: HOW HARD IS IT FOR YOU TO PAY FOR THE VERY BASICS LIKE FOOD, HOUSING, MEDICAL CARE, AND HEATING?: NOT HARD AT ALL

## 2023-07-20 SDOH — HEALTH STABILITY: PHYSICAL HEALTH: ON AVERAGE, HOW MANY MINUTES DO YOU ENGAGE IN EXERCISE AT THIS LEVEL?: 30 MIN

## 2023-07-20 SDOH — ECONOMIC STABILITY: FOOD INSECURITY: WITHIN THE PAST 12 MONTHS, THE FOOD YOU BOUGHT JUST DIDN'T LAST AND YOU DIDN'T HAVE MONEY TO GET MORE.: NEVER TRUE

## 2023-07-20 ASSESSMENT — ENCOUNTER SYMPTOMS
HEMATOCHEZIA: 0
SORE THROAT: 0
FEVER: 0
FREQUENCY: 0
EYE PAIN: 0
HEADACHES: 0
WEAKNESS: 0
CONSTIPATION: 0
HEARTBURN: 0
ABDOMINAL PAIN: 0
HEMATURIA: 0
COUGH: 0
CHILLS: 0
DIARRHEA: 0
PALPITATIONS: 0
SHORTNESS OF BREATH: 0
BREAST MASS: 0
NAUSEA: 0
MYALGIAS: 0
DIZZINESS: 0
ARTHRALGIAS: 1
DYSURIA: 0
NERVOUS/ANXIOUS: 0
PARESTHESIAS: 0
JOINT SWELLING: 0

## 2023-07-20 ASSESSMENT — LIFESTYLE VARIABLES
SKIP TO QUESTIONS 9-10: 1
HOW OFTEN DO YOU HAVE A DRINK CONTAINING ALCOHOL: 2-3 TIMES A WEEK
HOW MANY STANDARD DRINKS CONTAINING ALCOHOL DO YOU HAVE ON A TYPICAL DAY: 1 OR 2
HOW OFTEN DO YOU HAVE SIX OR MORE DRINKS ON ONE OCCASION: NEVER
AUDIT-C TOTAL SCORE: 3

## 2023-07-20 ASSESSMENT — SOCIAL DETERMINANTS OF HEALTH (SDOH)
DO YOU BELONG TO ANY CLUBS OR ORGANIZATIONS SUCH AS CHURCH GROUPS UNIONS, FRATERNAL OR ATHLETIC GROUPS, OR SCHOOL GROUPS?: YES
HOW OFTEN DO YOU GET TOGETHER WITH FRIENDS OR RELATIVES?: ONCE A WEEK
IN A TYPICAL WEEK, HOW MANY TIMES DO YOU TALK ON THE PHONE WITH FAMILY, FRIENDS, OR NEIGHBORS?: THREE TIMES A WEEK
HOW OFTEN DO YOU ATTEND CHURCH OR RELIGIOUS SERVICES?: MORE THAN 4 TIMES PER YEAR

## 2023-07-25 ENCOUNTER — OFFICE VISIT (OUTPATIENT)
Dept: PEDIATRICS | Facility: CLINIC | Age: 63
End: 2023-07-25
Payer: COMMERCIAL

## 2023-07-25 VITALS
DIASTOLIC BLOOD PRESSURE: 76 MMHG | HEIGHT: 68 IN | HEART RATE: 79 BPM | SYSTOLIC BLOOD PRESSURE: 118 MMHG | RESPIRATION RATE: 20 BRPM | WEIGHT: 142.9 LBS | TEMPERATURE: 97.7 F | OXYGEN SATURATION: 98 % | BODY MASS INDEX: 21.66 KG/M2

## 2023-07-25 DIAGNOSIS — R09.81 NASAL CONGESTION: ICD-10-CM

## 2023-07-25 DIAGNOSIS — Z00.00 ROUTINE GENERAL MEDICAL EXAMINATION AT A HEALTH CARE FACILITY: Primary | ICD-10-CM

## 2023-07-25 DIAGNOSIS — M70.62 TROCHANTERIC BURSITIS OF BOTH HIPS: ICD-10-CM

## 2023-07-25 DIAGNOSIS — M54.16 LUMBAR RADICULOPATHY: ICD-10-CM

## 2023-07-25 DIAGNOSIS — R42 VERTIGO: ICD-10-CM

## 2023-07-25 DIAGNOSIS — R06.83 SNORING: ICD-10-CM

## 2023-07-25 DIAGNOSIS — R09.A2 GLOBUS PHARYNGEUS: ICD-10-CM

## 2023-07-25 DIAGNOSIS — M70.61 TROCHANTERIC BURSITIS OF BOTH HIPS: ICD-10-CM

## 2023-07-25 DIAGNOSIS — N95.2 ATROPHIC VAGINITIS: ICD-10-CM

## 2023-07-25 DIAGNOSIS — Z12.4 CERVICAL CANCER SCREENING: ICD-10-CM

## 2023-07-25 DIAGNOSIS — H81.01 MENIERE'S DISEASE, RIGHT: ICD-10-CM

## 2023-07-25 PROCEDURE — 87624 HPV HI-RISK TYP POOLED RSLT: CPT | Performed by: INTERNAL MEDICINE

## 2023-07-25 PROCEDURE — 99396 PREV VISIT EST AGE 40-64: CPT | Performed by: INTERNAL MEDICINE

## 2023-07-25 PROCEDURE — G0123 SCREEN CERV/VAG THIN LAYER: HCPCS | Performed by: INTERNAL MEDICINE

## 2023-07-25 RX ORDER — ONDANSETRON 4 MG/1
4-8 TABLET, ORALLY DISINTEGRATING ORAL EVERY 8 HOURS PRN
Qty: 10 TABLET | Refills: 1 | Status: SHIPPED | OUTPATIENT
Start: 2023-07-25

## 2023-07-25 RX ORDER — FLUTICASONE PROPIONATE 50 MCG
SPRAY, SUSPENSION (ML) NASAL
Qty: 16 G | Refills: 11 | Status: SHIPPED | OUTPATIENT
Start: 2023-07-25

## 2023-07-25 RX ORDER — MECLIZINE HYDROCHLORIDE 25 MG/1
25 TABLET ORAL 3 TIMES DAILY PRN
Qty: 30 TABLET | Refills: 1 | Status: SHIPPED | OUTPATIENT
Start: 2023-07-25

## 2023-07-25 RX ORDER — ESTRADIOL 0.1 MG/G
2 CREAM VAGINAL
Qty: 42.5 G | Refills: 11 | Status: SHIPPED | OUTPATIENT
Start: 2023-07-26 | End: 2023-11-02

## 2023-07-25 ASSESSMENT — ENCOUNTER SYMPTOMS
COUGH: 1
NERVOUS/ANXIOUS: 0
NAUSEA: 0
MYALGIAS: 0
HEADACHES: 0
EYE PAIN: 0
FEVER: 0
DIZZINESS: 0
FREQUENCY: 0
ARTHRALGIAS: 1
SORE THROAT: 0
JOINT SWELLING: 0
HEMATURIA: 0
CHILLS: 0
CONSTIPATION: 0
PARESTHESIAS: 0
SHORTNESS OF BREATH: 0
HEMATOCHEZIA: 0
ABDOMINAL PAIN: 0
DIARRHEA: 0
HEARTBURN: 0
WEAKNESS: 0
PALPITATIONS: 0
BREAST MASS: 0
DYSURIA: 0

## 2023-07-25 ASSESSMENT — PAIN SCALES - GENERAL: PAINLEVEL: MILD PAIN (2)

## 2023-07-25 NOTE — PROGRESS NOTES
SUBJECTIVE:   CC: Cornelia is an 63 year old who presents for preventive health visit.       7/25/2023     1:39 PM   Additional Questions   Roomed by Sendy tyson   Accompanied by N/A         7/25/2023     1:39 PM   Patient Reported Additional Medications   Patient reports taking the following new medications Yes, Topical cream     Healthy Habits:     Getting at least 3 servings of Calcium per day:  NO    Bi-annual eye exam:  Yes    Dental care twice a year:  Yes    Sleep apnea or symptoms of sleep apnea:  Excessive snoring    Diet:  Regular (no restrictions)    Frequency of exercise:  4-5 days/week    Duration of exercise:  15-30 minutes    Taking medications regularly:  Yes    Medication side effects:  None    Additional concerns today:  Yes  Aches and pains - hips bothering her.  Bilateral lateral hips.  Sometimes will be walking along and has sudden onset of taking a step with right leg and muscle just gives out.  When takes weight off is totally fine.  This happens about several times a day a couple of times a week.  Maybe some pain going down leg.    Urinary symptoms started in June - lower abdominal pain.  Urine culture no growth.  Felt like a cross between menstrual pain and pregnancy pain.  Is back to normal.      Snoring is getting worse and  thinks she wakes up but smart watch says is fine and not sleepy when wakes.    Today's PHQ-2 Score:       7/25/2023     1:33 PM   PHQ-2 ( 1999 [employer])   Q1: Little interest or pleasure in doing things 0   Q2: Feeling down, depressed or hopeless 0   PHQ-2 Score 0   Q1: Little interest or pleasure in doing things Not at all   Q2: Feeling down, depressed or hopeless Not at all   PHQ-2 Score 0         Have you ever done Advance Care Planning? (For example, a Health Directive, POLST, or a discussion with a medical provider or your loved ones about your wishes): Yes, patient states has an Advance Care Planning document and will bring a copy to the clinic.    Social  History     Tobacco Use     Smoking status: Never     Smokeless tobacco: Never   Substance Use Topics     Alcohol use: Yes     Comment: few drinks per night on weekend             2023     9:12 AM   Alcohol Use   Prescreen: >3 drinks/day or >7 drinks/week? Yes   Reviewed orders with patient.  Reviewed health maintenance and updated orders accordingly - Yes  Labs reviewed in EPIC    Breast Cancer Screenin/8/2022    11:47 AM 2022    11:46 AM   Breast CA Risk Assessment (FHS-7)   Do you have a family history of breast, colon, or ovarian cancer? Yes No / Unknown         Mammogram Screening: Recommended mammography every 1-2 years with patient discussion and risk factor consideration  Pertinent mammograms are reviewed under the imaging tab.    History of abnormal Pap smear: NO - age 30-65 PAP every 5 years with negative HPV co-testing recommended      Latest Ref Rng & Units 4/3/2018     6:43 PM 4/3/2018     6:05 PM 2013    12:00 AM   PAP / HPV   PAP (Historical)   NIL  NIL    HPV 16 DNA NEG^Negative Negative      HPV 18 DNA NEG^Negative Negative      Other HR HPV NEG^Negative Negative        Reviewed and updated as needed this visit by clinical staff   Tobacco  Allergies  Meds  Problems    Fam Hx          Reviewed and updated as needed this visit by Provider     Meds  Problems    Fam Hx             Review of Systems   Constitutional:  Negative for chills and fever.   HENT:  Positive for congestion. Negative for ear pain, hearing loss and sore throat.    Eyes:  Negative for pain and visual disturbance.   Respiratory:  Positive for cough. Negative for shortness of breath.    Cardiovascular:  Negative for chest pain, palpitations and peripheral edema.   Gastrointestinal:  Negative for abdominal pain, constipation, diarrhea, heartburn, hematochezia and nausea.   Breasts:  Negative for tenderness, breast mass and discharge.   Genitourinary:  Negative for dysuria, frequency, genital sores,  "hematuria, pelvic pain, urgency, vaginal bleeding and vaginal discharge.   Musculoskeletal:  Positive for arthralgias and arthritis. Negative for joint swelling and myalgias.   Skin:  Negative for rash.   Neurological:  Negative for dizziness, weakness, headaches and paresthesias.   Psychiatric/Behavioral:  Negative for mood changes. The patient is not nervous/anxious.         OBJECTIVE:   /76 (BP Location: Right arm, Patient Position: Sitting, Cuff Size: Adult Regular)   Pulse 79   Temp 97.7  F (36.5  C) (Oral)   Resp 20   Ht 1.726 m (5' 7.95\")   Wt 64.8 kg (142 lb 14.4 oz)   LMP 09/20/2015   SpO2 98%   BMI 21.76 kg/m    Physical Exam  GENERAL APPEARANCE: healthy, alert and no distress  EYES: Eyes grossly normal to inspection, PERRL and conjunctivae and sclerae normal  HENT: ear canals and TM's normal, nose and mouth without ulcers or lesions, oropharynx clear and oral mucous membranes moist  NECK: no adenopathy, no asymmetry, masses, or scars and thyroid normal to palpation  RESP: lungs clear to auscultation - no rales, rhonchi or wheezes  BREAST: normal without masses, tenderness or nipple discharge and no palpable axillary masses or adenopathy  CV: regular rate and rhythm, normal S1 S2, no S3 or S4, no murmur, click or rub, no peripheral edema and peripheral pulses strong  ABDOMEN: soft, nontender, no hepatosplenomegaly, no masses and bowel sounds normal   (female): normal female external genitalia, normal urethral meatus, vaginal mucosal atrophy noted, normal cervix without masses or abnormal discharge  MS: no musculoskeletal defects are noted and gait is age appropriate without ataxia  SKIN: no suspicious lesions or rashes  NEURO: Normal strength and tone, sensory exam grossly normal, mentation intact and speech normal  PSYCH: mentation appears normal and affect normal/bright    Diagnostic Test Results:  Labs reviewed in Epic    ASSESSMENT/PLAN:   Routine general medical examination at a [facility]" care facility  Routine health education discussed: calcium, diet, exercise, weight, safety.     Cervical cancer screening    - Pap Screen with HPV - recommended age 30 - 65 years  - HPV Hold (Lab Only)    Nasal congestion  refill  - fluticasone (FLONASE) 50 MCG/ACT nasal spray; SHAKE LIQUID AND USE 1 TO 2 SPRAYS IN EACH NOSTRIL IN THE MORNING    Trochanteric bursitis of both hips  Discussed.  Notify if wants referral for PT or ortho    Lumbar radiculopathy  refer  - Spine  Referral; Future    Meniere's disease, right  refer  - Adult ENT  Referral; Future    Snoring  refer  - Adult ENT  Referral; Future    Globus pharyngeus  refer  - Adult ENT  Referral; Future    Vertigo  Refill and refer to ENT  - ondansetron (ZOFRAN ODT) 4 MG ODT tab; Take 1-2 tablets (4-8 mg) by mouth every 8 hours as needed for nausea  - meclizine (ANTIVERT) 25 MG tablet; Take 1 tablet (25 mg) by mouth 3 times daily as needed for dizziness    Atrophic vaginitis  Discussed.  Prescription per orders  - estradiol (ESTRACE) 0.1 MG/GM vaginal cream; Place 2 g vaginally three times a week              COUNSELING:  Reviewed preventive health counseling, as reflected in patient instructions        She reports that she has never smoked. She has never used smokeless tobacco.          Alicia Fisher MD  [facility] EAGANAnswers submitted by the patient for this visit:  Annual Preventive Visit (Submitted on 7/20/2023)  Chief Complaint: Annual Exam:  Frequency of exercise:: 4-5 days/week  Getting at least 3 servings of Calcium per day:: NO  Diet:: Regular (no restrictions)  Taking medications regularly:: Yes  Medication side effects:: None  Bi-annual eye exam:: Yes  Dental care twice a year:: Yes  Sleep apnea or symptoms of sleep apnea:: Excessive snoring  abdominal pain: No  Blood in stool: No  Blood in urine: No  chest pain: No  chills: No  congestion: Yes  constipation: No  cough: No  diarrhea:  No  dizziness: No  ear pain: No  eye pain: No  nervous/anxious: No  fever: No  frequency: No  genital sores: No  headaches: No  hearing loss: No  heartburn: No  arthralgias: Yes  joint swelling: No  peripheral edema: No  mood changes: No  myalgias: No  nausea: No  dysuria: No  palpitations: No  Skin sensation changes: No  sore throat: No  urgency: No  rash: No  shortness of breath: No  visual disturbance: No  weakness: No  pelvic pain: No  vaginal bleeding: No  vaginal discharge: No  tenderness: No  breast mass: No  breast discharge: No  Additional concerns today:: Yes  Exercise outside of work (Submitted on 7/20/2023)  Chief Complaint: Annual Exam:  Duration of exercise:: 15-30 minutes

## 2023-07-25 NOTE — PATIENT INSTRUCTIONS
Let me know if you get pelvic pain again or more blood in your urine and I will order more testing.    Preventive Health Recommendations  Female Ages 50 - 64    Yearly exam: See your health care provider every year in order to  Review health changes.   Discuss preventive care.    Review your medicines if your doctor has prescribed any.    Get a Pap test every three years (unless you have an abnormal result and your provider advises testing more often).  If you get Pap tests with HPV test, you only need to test every 5 years, unless you have an abnormal result.   You do not need a Pap test if your uterus was removed (hysterectomy) and you have not had cancer.  You should be tested each year for STDs (sexually transmitted diseases) if you're at risk.   Have a mammogram every 1 to 2 years.  Have a colonoscopy at age 50, or have a yearly FIT test (stool test). These exams screen for colon cancer.    Have a cholesterol test every 5 years, or more often if advised.  Have a diabetes test (fasting glucose) every three years. If you are at risk for diabetes, you should have this test more often.   If you are at risk for osteoporosis (brittle bone disease), think about having a bone density scan (DEXA).    Shots: Get a flu shot each year. Get a tetanus shot every 10 years.    Nutrition:   Eat at least 5 servings of fruits and vegetables each day.  Eat whole-grain bread, whole-wheat pasta and brown rice instead of white grains and rice.  Get adequate Calcium and Vitamin D.     Lifestyle  Exercise at least 150 minutes a week (30 minutes a day, 5 days a week). This will help you control your weight and prevent disease.  Limit alcohol to one drink per day.  No smoking.   Wear sunscreen to prevent skin cancer.   See your dentist every six months for an exam and cleaning.  See your eye doctor every 1 to 2 years.    Call to schedule ENT for the throat and ear issues  They will call you to schedule with spine about the pinched  nerve

## 2023-07-28 LAB
BKR LAB AP GYN ADEQUACY: NORMAL
BKR LAB AP GYN INTERPRETATION: NORMAL
BKR LAB AP HPV REFLEX: NORMAL
BKR LAB AP PREVIOUS ABNORMAL: NORMAL
PATH REPORT.COMMENTS IMP SPEC: NORMAL
PATH REPORT.COMMENTS IMP SPEC: NORMAL
PATH REPORT.RELEVANT HX SPEC: NORMAL

## 2023-07-31 LAB
HUMAN PAPILLOMA VIRUS 16 DNA: NEGATIVE
HUMAN PAPILLOMA VIRUS 18 DNA: NEGATIVE
HUMAN PAPILLOMA VIRUS FINAL DIAGNOSIS: NORMAL
HUMAN PAPILLOMA VIRUS OTHER HR: NEGATIVE

## 2023-08-29 ENCOUNTER — HOSPITAL ENCOUNTER (OUTPATIENT)
Dept: ULTRASOUND IMAGING | Facility: CLINIC | Age: 63
Discharge: HOME | End: 2023-08-29
Attending: INTERNAL MEDICINE | Admitting: INTERNAL MEDICINE
Payer: COMMERCIAL

## 2023-08-29 DIAGNOSIS — R10.2 PELVIC PAIN IN FEMALE: ICD-10-CM

## 2023-08-29 PROCEDURE — 76830 TRANSVAGINAL US NON-OB: CPT

## 2023-10-04 ENCOUNTER — OFFICE VISIT (OUTPATIENT)
Dept: OBGYN | Facility: CLINIC | Age: 63
End: 2023-10-04
Payer: COMMERCIAL

## 2023-10-04 VITALS
SYSTOLIC BLOOD PRESSURE: 136 MMHG | BODY MASS INDEX: 22.91 KG/M2 | HEIGHT: 67 IN | WEIGHT: 146 LBS | DIASTOLIC BLOOD PRESSURE: 80 MMHG

## 2023-10-04 DIAGNOSIS — Z11.3 SCREEN FOR STD (SEXUALLY TRANSMITTED DISEASE): ICD-10-CM

## 2023-10-04 DIAGNOSIS — R10.2 PELVIC PAIN IN FEMALE: ICD-10-CM

## 2023-10-04 DIAGNOSIS — R93.89 THICKENED ENDOMETRIUM: Primary | ICD-10-CM

## 2023-10-04 PROBLEM — H81.09 MENIERE'S DISEASE: Status: ACTIVE | Noted: 2019-07-22

## 2023-10-04 PROCEDURE — 87491 CHLMYD TRACH DNA AMP PROBE: CPT | Performed by: OBSTETRICS & GYNECOLOGY

## 2023-10-04 PROCEDURE — 88305 TISSUE EXAM BY PATHOLOGIST: CPT | Performed by: PATHOLOGY

## 2023-10-04 PROCEDURE — 58100 BIOPSY OF UTERUS LINING: CPT | Performed by: OBSTETRICS & GYNECOLOGY

## 2023-10-04 PROCEDURE — 87591 N.GONORRHOEAE DNA AMP PROB: CPT | Performed by: OBSTETRICS & GYNECOLOGY

## 2023-10-04 PROCEDURE — 99203 OFFICE O/P NEW LOW 30 MIN: CPT | Mod: 25 | Performed by: OBSTETRICS & GYNECOLOGY

## 2023-10-04 NOTE — PROGRESS NOTES
Assessment & Plan     Thickened endometrium  - Need to evaluate for possible endometrial hyperplasia or CA w/ EMBx done today  - Surgical Pathology Exam  - ENDOMETRIAL BIOPSY W/O CERVICAL DILATION done today  - If path shows atrophic endometrium, inactive endometrium, or insufficient for Dx, will need Op Hyst w/ IUM to confirm no polyp or other pathology.  - If path shows hyperplasia or CA, Rx as appropriate    Pelvic pain in female  - Not likely due to Gyn etiology since U/S is essentially WNL.  Will r/o infection as noted below.  - Above endometrial findings probably are not cause of pain.  Would have her follow-up w/ PCP to look for GI or Urinary causes.    Screen for STD (sexually transmitted disease)  - Will r/o STDs as cause of pain.  - NEISSERIA GONORRHOEA PCR  - CHLAMYDIA TRACHOMATIS PCR    Review of the result(s) of each unique test - Pelvic U/S 8/29/2023             No follow-ups on file.    Cade Ray MD  [facility] MIRA Locke is a 63 year old, presenting for the following health issues:  Follow Up (Ultrasound )      Pt here for thickened endometrium on U/S.  She originally presented to PCP w/ c/o 3 episodes of pelvic pain, bilateral, typically achy but occas crampy like menstrual cramps used to be.  She has been  > 10 years on no HRT.  She notes pain first occurred 6/2023 and lasted 5 days, and was associated w/ Fever 101.2, and bladder pressure.  She was Rx'd bu Urgent care for possible UTI w/ Abx.  She did improve.  Then in 8/2023 and again in 9/2023 she had episodes lasting 3-4 days of same pain, but no overt F/C.  She has no VB, discharge, N/V/C/D.  Has normal regular BMs.  No dysuria.  She had a Pap-WNL, HPV Neg 7/25/2023.  Back on 6/10/2023 a Wet Prep Neg and Ur C&S Neg.  Her PCP got Pt a pelvic U/S 8/29/2023 and uterus was normal  size but endometrial stripe was 5 mm and had anechoic area within and the endometrium was irregular in contour, the adnexa  "are w/o masses.                   Review of Systems         Objective    /80 (BP Location: Right arm, Patient Position: Sitting, Cuff Size: Adult Regular)   Ht 1.702 m (5' 7\")   Wt 66.2 kg (146 lb)   LMP 09/20/2015   BMI 22.87 kg/m    Body mass index is 22.87 kg/m .  Physical Exam  Constitutional:       General: She is not in acute distress.     Appearance: Normal appearance. She is normal weight. She is not ill-appearing.   Neurological:      Mental Status: She is alert.     Abdomen- Abdomen soft, non-tender. BS normal. No masses, organomegaly  Pelvic- Exam chaperoned by nurse, External genitalia normal, Bartholin's glands normal, Natalia's glands normal, Urethral meatus normal, Urethra normal, Bladder normal, Vagina with normal rugae, no abnormal lesions, no abnormal discharge, Normal cervix without lesions or mucopus, no cervical motion tenderness, Uterus normal size, shape, and contour, no masses, non-tender, Adnexa normal size without masses or tenderness bilaterally, Anus normal, GC/Chlam probe was Done, EMBx performed as noted below.      PROCEDURE:  Endometrial Biopsy      Indications for procedure:  Evaluation of Thickened endometrium    Pre-Procedure Medications:  None    The proposed procedure to diagnose the above condition was explained to the patient.  Risks, side effects, benefits, and alternatives were discussed. All questions were answered to patient's satisfaction. The patient gave informed consent.       The patient was placed in the dorsal lithotomy position and the perineum was exposed.  External genitalia appeared normal.  The uterus was mobile with normal size, shape, and consistency, without tenderness.  The adnexae palpated normally on bimanual exam without mass or tenderness. The uterus was anteverted  Appropriate sterile technique was used throughout the procedure.  A speculum was inserted and the cervix was visualized.  The cervix was cleansed with antiseptic solution.  A " tenaculum was applied to the anterior lip of the cervix.  The uterus was sounded to 6 cm.  The Pipelle Endometrial Suction Curette was used and 1 rotating pass(s) were made between the fundus and the internal os. An scant sample was obtained and sent to pathology.  Instruments were removed.  The patient experienced mild cramping during the procedure.  This subsided rapidly after instruments were removed.  The patient remained supine for a few moments after the procedure and had no other complications. No heavy bleeding was observed.       The patient was instructed to report any fever, cramping after 48 hours, or bleeding for 24-48 hours heavier than normal menses. OTC NSAIDs may be used for cramping PRN. Sexual relations may be resumed in 2-3 days, or after bleeding has stopped.   Pt. is to contact our office if she has not received the pathology report within 1-2 weeks of the procedure.      Pelvic U/S 8/29/2023:  Results for orders placed or performed during the hospital encounter of 08/29/23   US Pelvic Complete with Transvaginal    Narrative    EXAM: US PELVIC TRANSABDOMINAL AND TRANSVAGINAL  LOCATION: [facility]  DATE: 8/29/2023    INDICATION:  Pelvic pain in female  COMPARISON: None.  TECHNIQUE: Transabdominal scans were performed. Endovaginal ultrasound was performed to better visualize the adnexa.    FINDINGS:    UTERUS: 6.6 x 4.9 x 3.2 cm. Normal in size and position with no masses.    ENDOMETRIUM: 5 mm. The uterus is heterogeneous with cystic areas seen within the endometrial bilayer.    RIGHT OVARY: 2 x 1.3 x 1 cm. Normal.    LEFT OVARY: 1.5 x 1.1 x 1.1 cm. Normal.    No significant free fluid.      Impression    IMPRESSION:  1.  Heterogeneously echoic endometrium with cystic areas present, the endometrium is at the upper limits of normal for a postmenopausal patient measuring 5 mm, this may reflect mild cystic endometrial hyperplasia, correlation with patient's gynecological    exam and further evaluation is warranted MRI of the uterus can be obtained for more complete evaluation

## 2023-10-05 LAB
C TRACH DNA SPEC QL NAA+PROBE: NEGATIVE
N GONORRHOEA DNA SPEC QL NAA+PROBE: NEGATIVE

## 2023-10-08 LAB
PATH REPORT.COMMENTS IMP SPEC: NORMAL
PATH REPORT.COMMENTS IMP SPEC: NORMAL
PATH REPORT.FINAL DX SPEC: NORMAL
PATH REPORT.GROSS SPEC: NORMAL
PATH REPORT.MICROSCOPIC SPEC OTHER STN: NORMAL
PATH REPORT.RELEVANT HX SPEC: NORMAL
PHOTO IMAGE: NORMAL

## 2023-10-09 ENCOUNTER — MYC MEDICAL ADVICE (OUTPATIENT)
Dept: OBGYN | Facility: CLINIC | Age: 63
End: 2023-10-09
Payer: COMMERCIAL

## 2023-10-09 ENCOUNTER — TELEPHONE (OUTPATIENT)
Dept: OBGYN | Facility: CLINIC | Age: 63
End: 2023-10-09
Payer: COMMERCIAL

## 2023-10-09 DIAGNOSIS — R93.89 THICKENED ENDOMETRIUM: Primary | ICD-10-CM

## 2023-10-09 NOTE — TELEPHONE ENCOUNTER
Pt would like to proceed with procedure as noted from 10/4/23 path results:    Although this is reassuring, I think as a precaution we should consider doing the hysteroscopy to look in the cavity and make sure there isn't anything else going on, and to get a larger sample of the uterine  lining.       Lilly MONTGOMERY RN BSN

## 2023-10-09 NOTE — TELEPHONE ENCOUNTER
Patient Name: Celeste Alcala   MRN: [id number]   Case#: [id number]   Surgeon(s) and Role:      * Cade Ray MD - Primary   Date requested: * No dates entered *   Location: [facility] OR   Procedure(s):   HYSTEROSCOPY, WITH DILATION AND CURETTAGE OF UTERUS USING MORCELLATOR (

## 2023-10-10 NOTE — TELEPHONE ENCOUNTER
Type of surgery: hysteroscopy d&c with morcellator  Location of surgery: [facility]  Date and time of surgery: 11/7/23 @ 9:05 am  Surgeon: Dr. Ray  Pre-Op Appt Date: Patient advised to schedule.  Post-Op Appt Date:    Packet sent out: Yes  Pre-cert/Authorization completed:  No  Date: 10/10/23

## 2023-10-16 ENCOUNTER — TRANSFERRED RECORDS (OUTPATIENT)
Dept: HEALTH INFORMATION MANAGEMENT | Facility: CLINIC | Age: 63
End: 2023-10-16
Payer: COMMERCIAL

## 2023-10-28 NOTE — COMMUNITY RESOURCES LIST (ENGLISH)
10/28/2023   [facility]  N/A  For questions about this resource list or additional care needs, please contact your primary care clinic or care manager.  Phone: [phone]   Email: N/A   Address: [address] [place] [id number]   Hours: N/A        Financial Stability       Utility payment assistance  1  [employer] [facility] - Energy Assistance Distance: 1.58 miles      Phone/Virtual   [address] [place] [address]  Language: English, Hmong, Tongan, Swedish  Hours: Mon - Fri 8:00 AM - 4:30 PM  Fees: Free   Phone: (380) 914-7685 Email: info@caprw.org Website: http://www.caprw.org/     2  [facility] Distance: 2.63 miles      Phone/Virtual   [address] [place] [address]  Language: English, Hmong, Thai, Swedish  Hours: Mon - Fri 10:00 AM - 3:00 PM  Fees: Free   Phone: (390) 991-7080 Email: [email or web address] Website: [email or web address]/community/[email or web address]/          Important Numbers & Websites       Emergency Services   911  City Services   311  Poison Control   (918) 923-8153  Suicide Prevention Lifeline   (862) 567-1782 (TALK)  Child Abuse Hotline   (991) 515-3763 (4-A-Child)  Sexual Assault Hotline   (468) 313-5542 (HOPE)  National Runaway Safeline   (859) 862-8389 (RUNAWAY)  All-Options Talkline   (823) 829-3993  Substance Abuse Referral   (828) 626-4488 (HELP)

## 2023-11-02 ENCOUNTER — OFFICE VISIT (OUTPATIENT)
Dept: PEDIATRICS | Facility: CLINIC | Age: 63
End: 2023-11-02
Payer: COMMERCIAL

## 2023-11-02 VITALS
OXYGEN SATURATION: 98 % | RESPIRATION RATE: 16 BRPM | HEIGHT: 67 IN | WEIGHT: 146.1 LBS | TEMPERATURE: 97.5 F | HEART RATE: 73 BPM | DIASTOLIC BLOOD PRESSURE: 80 MMHG | SYSTOLIC BLOOD PRESSURE: 108 MMHG | BODY MASS INDEX: 22.93 KG/M2

## 2023-11-02 DIAGNOSIS — C44.211: ICD-10-CM

## 2023-11-02 DIAGNOSIS — H81.09 MENIERE'S DISEASE, UNSPECIFIED LATERALITY: ICD-10-CM

## 2023-11-02 DIAGNOSIS — Z85.820 HISTORY OF MELANOMA: ICD-10-CM

## 2023-11-02 DIAGNOSIS — Z86.16 HISTORY OF COVID-19: ICD-10-CM

## 2023-11-02 DIAGNOSIS — Z01.818 PREOP GENERAL PHYSICAL EXAM: Primary | ICD-10-CM

## 2023-11-02 DIAGNOSIS — R93.89 THICKENED ENDOMETRIUM: ICD-10-CM

## 2023-11-02 PROCEDURE — 99214 OFFICE O/P EST MOD 30 MIN: CPT | Performed by: NURSE PRACTITIONER

## 2023-11-02 ASSESSMENT — PAIN SCALES - GENERAL: PAINLEVEL: NO PAIN (0)

## 2023-11-02 NOTE — PATIENT INSTRUCTIONS
Preparing for Your Surgery  Getting started  A nurse will call you to review your health history and instructions. They will give you an arrival time based on your scheduled surgery time. Please be ready to share:  Your doctor's clinic name and phone number  Your medical, surgical, and anesthesia history  A list of allergies and sensitivities  A list of medicines, including herbal treatments and over-the-counter drugs  Whether the patient has a legal guardian (ask how to send us the papers in advance)  Please tell us if you're pregnant--or if there's any chance you might be pregnant. Some surgeries may injure a fetus (unborn baby), so they require a pregnancy test. Surgeries that are safe for a fetus don't always need a test, and you can choose whether to have one.   If you have a child who's having surgery, please ask for a copy of Preparing for Your Child's Surgery.    Preparing for surgery  Within 10 to 30 days of surgery: Have a pre-op exam (sometimes called an H&P, or History and Physical). This can be done at a clinic or pre-operative center.  If you're having a , you may not need this exam. Talk to your care team.  At your pre-op exam, talk to your care team about all medicines you take. If you need to stop any medicines before surgery, ask when to start taking them again.  We do this for your safety. Many medicines can make you bleed too much during surgery. Some change how well surgery (anesthesia) drugs work.  Call your insurance company to let them know you're having surgery. (If you don't have insurance, call [phone].)  Call your clinic if there's any change in your health. This includes signs of a cold or flu (sore throat, runny nose, cough, rash, fever). It also includes a scrape or scratch near the surgery site.  If you have questions on the day of surgery, call your hospital or surgery center.  Eating and drinking guidelines  For your safety: Unless your surgeon tells you otherwise,  follow the guidelines below.  Eat and drink as usual until 8 hours before you arrive for surgery. After that, no food or milk.  Drink clear liquids until 2 hours before you arrive. These are liquids you can see through, like water, Gatorade, and Propel Water. They also include plain black coffee and tea (no cream or milk), candy, and breath mints. You can spit out gum when you arrive.  If you drink alcohol: Stop drinking it the night before surgery.  If your care team tells you to take medicine on the morning of surgery, it's okay to take it with a sip of water.  Preventing infection  Shower or bathe the night before and morning of your surgery. Follow the instructions your clinic gave you. (If no instructions, use regular soap.)  Don't shave or clip hair near your surgery site. We'll remove the hair if needed.  Don't smoke or vape the morning of surgery. You may chew nicotine gum up to 2 hours before surgery. A nicotine patch is okay.  Note: Some surgeries require you to completely quit smoking and nicotine. Check with your surgeon.  Your care team will make every effort to keep you safe from infection. We will:  Clean our hands often with soap and water (or an alcohol-based hand rub).  Clean the skin at your surgery site with a special soap that kills germs.  Give you a special gown to keep you warm. (Cold raises the risk of infection.)  Wear special hair covers, masks, gowns and gloves during surgery.  Give antibiotic medicine, if prescribed. Not all surgeries need antibiotics.  What to bring on the day of surgery  Photo ID and insurance card  Copy of your health care directive, if you have one  Glasses and hearing aids (bring cases)  You can't wear contacts during surgery  Inhaler and eye drops, if you use them (tell us about these when you arrive)  CPAP machine or breathing device, if you use them  A few personal items, if spending the night  If you have . . .  A pacemaker, ICD (cardiac defibrillator) or other  implant: Bring the ID card.  An implanted stimulator: Bring the remote control.  A legal guardian: Bring a copy of the certified (court-stamped) guardianship papers.  Please remove any jewelry, including body piercings. Leave jewelry and other valuables at home.  If you're going home the day of surgery  You must have a responsible adult drive you home. They should stay with you overnight as well.  If you don't have someone to stay with you, and you aren't safe to go home alone, we may keep you overnight. Insurance often won't pay for this.  After surgery  If it's hard to control your pain or you need more pain medicine, please call your surgeon's office.  Questions?   If you have any questions for your care team, list them here: _________________________________________________________________________________________________________________________________________________________________________ ____________________________________ ____________________________________ ____________________________________  For informational purposes only. Not to replace the advice of your health care provider. Copyright   2003, 2019 [employer]. All rights reserved. Clinically reviewed by Danay Kelsey MD. [employer] 207265 - REV 12/22.    How to Take Your Medication Before Surgery  - STOP taking all vitamins and herbal supplements 14 days before surgery.

## 2023-11-02 NOTE — PROGRESS NOTES
[facility]  [address]  [address]  [place] [address]  Phone: [phone]  Fax: [phone]  Primary Provider: Alicia Fisher  Pre-op Performing Provider: TED RODRIGUEZ      PREOPERATIVE EVALUATION:  Today's date: 11/2/2023    Cornelia is a 63 year old female who presents for a preoperative evaluation.      11/2/2023    10:32 AM   Additional Questions   Roomed by Virgen   Accompanied by self         11/2/2023    10:32 AM   Patient Reported Additional Medications   Patient reports taking the following new medications no       Surgical Information:  Surgery/Procedure: Hysteroscopy with dilation and curettage of uterus using morcellator.  Surgery Location: [facility]  Surgeon: Dr Cade Ray  Surgery Date: 11/7/23  Time of Surgery: 7:40am  Where patient plans to recover: At home with family  Fax number for surgical facility: Note does not need to be faxed, will be available electronically in Epic.    Assessment & Plan     The proposed surgical procedure is considered LOW risk.    Preop general physical exam  Thickened endometrium  Reason for procedure.    History of melanoma  Basal cell carcinoma of auricle of ear, unspecified laterality  Hx of this, follows with derm.    Meniere's disease, unspecified laterality  Follows with ENT, stable no concerns.    History of COVID-19  Dx about 3 weeks ago, no residual symptoms.     - No identified additional risk factors other than previously addressed    Antiplatelet or Anticoagulation Medication Instructions:   - Patient is on no antiplatelet or anticoagulation medications.    Additional Medication Instructions:  Patient is to take all scheduled medications on the day of surgery EXCEPT for modifications listed below:   - Herbal medications and vitamins: HOLD 14 days prior to surgery.    RECOMMENDATION:  APPROVAL GIVEN to proceed with proposed procedure, without further diagnostic evaluation.      Subjective        HPI related to upcoming procedure: thickened endometrium, needs hysteroscopy for follow-up testing        10/28/2023     1:11 PM   Preop Questions   1. Have you ever had a heart attack or stroke? No   2. Have you ever had surgery on your heart or blood vessels, such as a stent placement, a coronary artery bypass, or surgery on an artery in your head, neck, heart, or legs? No   3. Do you have chest pain with activity? No   4. Do you have a history of  heart failure? No   5. Do you currently have a cold, bronchitis or symptoms of other infection? No   6. Do you have a cough, shortness of breath, or wheezing? No   7. Do you or anyone in your family have previous history of blood clots? No   8. Do you or does anyone in your family have a serious bleeding problem such as prolonged bleeding following surgeries or cuts? No   9. Have you ever had problems with anemia or been told to take iron pills? No   10. Have you had any abnormal blood loss such as black, tarry or bloody stools, or abnormal vaginal bleeding? No   11. Have you ever had a blood transfusion? No   12. Are you willing to have a blood transfusion if it is medically needed before, during, or after your surgery? Yes   13. Have you or any of your relatives ever had problems with anesthesia? No   14. Do you have sleep apnea, excessive snoring or daytime drowsiness? No   15. Do you have any artifical heart valves or other implanted medical devices like a pacemaker, defibrillator, or continuous glucose monitor? No   16. Do you have artificial joints? No   17. Are you allergic to latex? No       Health Care Directive:  Patient does not have a Health Care Directive or Living Will:     Preoperative Review of :   reviewed - no record of controlled substances prescribed.    Status of Chronic Conditions:  See problem list for active medical problems.  Problems all longstanding and stable, except as noted/documented.  See ROS for pertinent symptoms related to  these conditions.    Review of Systems  Constitutional, neuro, ENT, endocrine, pulmonary, cardiac, gastrointestinal, genitourinary, musculoskeletal, integument and psychiatric systems are negative, except as otherwise noted.    Patient Active Problem List    Diagnosis Date Noted    History of melanoma 2023     Priority: Medium    Thickened endometrium 10/04/2023     Priority: Medium    Meniere's disease 2019     Priority: Medium    Basal cell carcinoma of auricle of ear 2013     Priority: Medium      Past Medical History:   Diagnosis Date    Unspecified sinusitis (chronic)      Past Surgical History:   Procedure Laterality Date    BIOPSY      melanoma biopsy    COLONOSCOPY  2012    Procedure:COLONOSCOPY;  Colonoscopy   ; Surgeon:BRIAN NAGEL; Location: [facility]    ENT SURGERY      tonsils    ZC NONSPECIFIC PROCEDURE      (R) knee surgery-leg length discrepency    ZZ NONSPECIFIC PROCEDURE      T&A    ZZC NONSPECIFIC PROCEDURE       x 2     Current Outpatient Medications   Medication Sig Dispense Refill    CALCIUM CARBONATE PO       fluticasone (FLONASE) 50 MCG/ACT nasal spray SHAKE LIQUID AND USE 1 TO 2 SPRAYS IN EACH NOSTRIL IN THE MORNING 16 g 11    meclizine (ANTIVERT) 25 MG tablet Take 1 tablet (25 mg) by mouth 3 times daily as needed for dizziness 30 tablet 1    multivitamin, therapeutic with minerals (MULTI-VITAMIN) TABS tablet Take 1 tablet by mouth daily      ondansetron (ZOFRAN ODT) 4 MG ODT tab Take 1-2 tablets (4-8 mg) by mouth every 8 hours as needed for nausea 10 tablet 1    Polypodium Leucotomos (HELIOCARE PO)       UNABLE TO FIND MEDICATION NAME: Lovastatin topical cream         Allergies   Allergen Reactions    No Known Drug Allergy         Social History     Tobacco Use    Smoking status: Never    Smokeless tobacco: Never   Substance Use Topics    Alcohol use: Yes     Comment: few drinks per night on weekend     Family History   Problem Relation Age of Onset     "Thyroid Disease Mother         graves    Dementia Mother     Hyperlipidemia Mother     Hyperlipidemia Father     Musculoskeletal Disorder Sister         MS    Other Cancer Sister     Dementia Brother     Gallbladder Disease Brother     Down Syndrome Brother      History   Drug Use No         Objective     /80 (BP Location: Right arm, Patient Position: Sitting, Cuff Size: Adult Regular)   Pulse 73   Temp 97.5  F (36.4  C) (Temporal)   Resp 16   Ht 1.702 m (5' 7\")   Wt 66.3 kg (146 lb 1.6 oz)   LMP 09/20/2015   SpO2 98%   BMI 22.88 kg/m      Physical Exam    GENERAL APPEARANCE: healthy, alert and no distress     EYES: EOMI, PERRL     HENT: ear canals and TM's normal and nose and mouth without ulcers or lesions     NECK: no adenopathy, no asymmetry, masses, or scars and thyroid normal to palpation     RESP: lungs clear to auscultation - no rales, rhonchi or wheezes     CV: regular rates and rhythm, normal S1 S2, no S3 or S4 and no murmur, click or rub     ABDOMEN:  soft, nontender, no HSM or masses and bowel sounds normal     MS: extremities normal- no gross deformities noted, no evidence of inflammation in joints, FROM in all extremities.     SKIN: no suspicious lesions or rashes     NEURO: Normal strength and tone, sensory exam grossly normal, mentation intact and speech normal     PSYCH: mentation appears normal. and affect normal/bright     LYMPHATICS: No cervical adenopathy    Recent Labs   Lab Test 06/19/23  1408 04/11/22  1601   HGB 12.0  --      --     141   POTASSIUM 4.0 4.0   CR 0.64 0.78        Diagnostics:  No labs were ordered during this visit.   No EKG required for low risk surgery (cataract, skin procedure, breast biopsy, etc).    Revised Cardiac Risk Index (RCRI):  The patient has the following serious cardiovascular risks for perioperative complications:   - No serious cardiac risks = 0 points     RCRI Interpretation: 0 points: Class I (very low risk - 0.4% complication " rate)         Signed Electronically by: Day Doran NP  Copy of this evaluation report is provided to requesting physician.

## 2023-11-02 NOTE — COMMUNITY RESOURCES LIST (ENGLISH)
11/02/2023   [facility] [employer]  N/A  For questions about this resource list or additional care needs, please contact your primary care clinic or care manager.  Phone: [phone]   Email: N/A   Address: [address] [place] [address]   Hours: N/A        Financial Stability       Utility payment assistance  1  [employer] [facility] - Energy Assistance Distance: 1.58 miles      Phone/Virtual   [address] [place] [address]  Language: English, Hmong, Nigerian, Nepalese  Hours: Mon - Fri 8:00 AM - 4:30 PM  Fees: Free   Phone: (818) 940-8379 Email: info@caprw.org Website: http://www.caprw.org/     2  [facility] Distance: 2.63 miles      Phone/Virtual   [address] [place] [address]  Language: English, Hmong, Mauritanian, Nepalese  Hours: Mon - Fri 10:00 AM - 3:00 PM  Fees: Free   Phone: (107) 559-2635 Email: [email or web address] Website: [email or web address]/          Important Numbers & Websites       Emergency Services   911  City Services   311  Poison Control   (451) 896-4986  Suicide Prevention Lifeline   (982) 906-3987 (TALK)  Child Abuse Hotline   (921) 581-7011 (4-A-Child)  Sexual Assault Hotline   (366) 840-5080 (HOPE)  National Runaway Safeline   (434) 409-2423 (RUNAWAY)  All-Options Talkline   (832) 830-1340  Substance Abuse Referral   (590) 455-3479 (HELP)

## 2023-11-07 ENCOUNTER — HOSPITAL ENCOUNTER (OUTPATIENT)
Facility: CLINIC | Age: 63
Discharge: HOME | End: 2023-11-07
Attending: OBSTETRICS & GYNECOLOGY | Admitting: OBSTETRICS & GYNECOLOGY
Payer: COMMERCIAL

## 2023-11-07 ENCOUNTER — ANESTHESIA EVENT (OUTPATIENT)
Dept: SURGERY | Facility: CLINIC | Age: 63
End: 2023-11-07
Payer: COMMERCIAL

## 2023-11-07 ENCOUNTER — ANESTHESIA (OUTPATIENT)
Dept: SURGERY | Facility: CLINIC | Age: 63
End: 2023-11-07
Payer: COMMERCIAL

## 2023-11-07 VITALS
HEIGHT: 67 IN | OXYGEN SATURATION: 100 % | RESPIRATION RATE: 12 BRPM | SYSTOLIC BLOOD PRESSURE: 142 MMHG | DIASTOLIC BLOOD PRESSURE: 96 MMHG | WEIGHT: 145.9 LBS | TEMPERATURE: 98.7 F | HEART RATE: 79 BPM | BODY MASS INDEX: 22.9 KG/M2

## 2023-11-07 DIAGNOSIS — N84.0 ENDOMETRIAL POLYP: Primary | ICD-10-CM

## 2023-11-07 DIAGNOSIS — R93.89 THICKENED ENDOMETRIUM: ICD-10-CM

## 2023-11-07 DIAGNOSIS — G89.18 POSTOPERATIVE PAIN: ICD-10-CM

## 2023-11-07 LAB — HGB BLD-MCNC: 12.7 G/DL (ref 11.7–15.7)

## 2023-11-07 PROCEDURE — 360N000076 HC SURGERY LEVEL 3, PER MIN: Performed by: OBSTETRICS & GYNECOLOGY

## 2023-11-07 PROCEDURE — 710N000012 HC RECOVERY PHASE 2, PER MINUTE: Performed by: OBSTETRICS & GYNECOLOGY

## 2023-11-07 PROCEDURE — 999N000141 HC STATISTIC PRE-PROCEDURE NURSING ASSESSMENT: Performed by: OBSTETRICS & GYNECOLOGY

## 2023-11-07 PROCEDURE — 250N000011 HC RX IP 250 OP 636: Performed by: OBSTETRICS & GYNECOLOGY

## 2023-11-07 PROCEDURE — 250N000013 HC RX MED GY IP 250 OP 250 PS 637: Performed by: OBSTETRICS & GYNECOLOGY

## 2023-11-07 PROCEDURE — 85018 HEMOGLOBIN: CPT | Performed by: OBSTETRICS & GYNECOLOGY

## 2023-11-07 PROCEDURE — 88305 TISSUE EXAM BY PATHOLOGIST: CPT | Mod: 26 | Performed by: STUDENT IN AN ORGANIZED HEALTH CARE EDUCATION/TRAINING PROGRAM

## 2023-11-07 PROCEDURE — 250N000025 HC SEVOFLURANE, PER MIN: Performed by: OBSTETRICS & GYNECOLOGY

## 2023-11-07 PROCEDURE — 272N000001 HC OR GENERAL SUPPLY STERILE: Performed by: OBSTETRICS & GYNECOLOGY

## 2023-11-07 PROCEDURE — 36415 COLL VENOUS BLD VENIPUNCTURE: CPT | Performed by: OBSTETRICS & GYNECOLOGY

## 2023-11-07 PROCEDURE — 258N000003 HC RX IP 258 OP 636: Performed by: ANESTHESIOLOGY

## 2023-11-07 PROCEDURE — 250N000011 HC RX IP 250 OP 636: Performed by: NURSE ANESTHETIST, CERTIFIED REGISTERED

## 2023-11-07 PROCEDURE — 58558 HYSTEROSCOPY BIOPSY: CPT | Performed by: OBSTETRICS & GYNECOLOGY

## 2023-11-07 PROCEDURE — 88305 TISSUE EXAM BY PATHOLOGIST: CPT | Mod: TC | Performed by: OBSTETRICS & GYNECOLOGY

## 2023-11-07 PROCEDURE — 370N000017 HC ANESTHESIA TECHNICAL FEE, PER MIN: Performed by: OBSTETRICS & GYNECOLOGY

## 2023-11-07 PROCEDURE — 710N000009 HC RECOVERY PHASE 1, LEVEL 1, PER MIN: Performed by: OBSTETRICS & GYNECOLOGY

## 2023-11-07 PROCEDURE — 250N000009 HC RX 250: Performed by: NURSE ANESTHETIST, CERTIFIED REGISTERED

## 2023-11-07 RX ORDER — PROPOFOL 10 MG/ML
INJECTION, EMULSION INTRAVENOUS PRN
Status: DISCONTINUED | OUTPATIENT
Start: 2023-11-07 | End: 2023-11-07

## 2023-11-07 RX ORDER — DEXAMETHASONE SODIUM PHOSPHATE 4 MG/ML
INJECTION, SOLUTION INTRA-ARTICULAR; INTRALESIONAL; INTRAMUSCULAR; INTRAVENOUS; SOFT TISSUE PRN
Status: DISCONTINUED | OUTPATIENT
Start: 2023-11-07 | End: 2023-11-07

## 2023-11-07 RX ORDER — FENTANYL CITRATE 50 UG/ML
25 INJECTION, SOLUTION INTRAMUSCULAR; INTRAVENOUS
Status: DISCONTINUED | OUTPATIENT
Start: 2023-11-07 | End: 2023-11-13 | Stop reason: HOSPADM

## 2023-11-07 RX ORDER — LIDOCAINE HYDROCHLORIDE 20 MG/ML
INJECTION, SOLUTION INFILTRATION; PERINEURAL PRN
Status: DISCONTINUED | OUTPATIENT
Start: 2023-11-07 | End: 2023-11-07

## 2023-11-07 RX ORDER — ONDANSETRON 2 MG/ML
4 INJECTION INTRAMUSCULAR; INTRAVENOUS EVERY 30 MIN PRN
Status: DISCONTINUED | OUTPATIENT
Start: 2023-11-07 | End: 2023-11-13 | Stop reason: HOSPADM

## 2023-11-07 RX ORDER — DEXAMETHASONE SODIUM PHOSPHATE 4 MG/ML
4 INJECTION, SOLUTION INTRA-ARTICULAR; INTRALESIONAL; INTRAMUSCULAR; INTRAVENOUS; SOFT TISSUE
Status: DISCONTINUED | OUTPATIENT
Start: 2023-11-07 | End: 2023-11-07 | Stop reason: HOSPADM

## 2023-11-07 RX ORDER — CEFAZOLIN SODIUM/WATER 2 G/20 ML
2 SYRINGE (ML) INTRAVENOUS SEE ADMIN INSTRUCTIONS
Status: DISCONTINUED | OUTPATIENT
Start: 2023-11-07 | End: 2023-11-07 | Stop reason: HOSPADM

## 2023-11-07 RX ORDER — ONDANSETRON 2 MG/ML
4 INJECTION INTRAMUSCULAR; INTRAVENOUS EVERY 30 MIN PRN
Status: DISCONTINUED | OUTPATIENT
Start: 2023-11-07 | End: 2023-11-07 | Stop reason: HOSPADM

## 2023-11-07 RX ORDER — ONDANSETRON 2 MG/ML
INJECTION INTRAMUSCULAR; INTRAVENOUS PRN
Status: DISCONTINUED | OUTPATIENT
Start: 2023-11-07 | End: 2023-11-07

## 2023-11-07 RX ORDER — METOPROLOL TARTRATE 1 MG/ML
1-2 INJECTION, SOLUTION INTRAVENOUS EVERY 5 MIN PRN
Status: DISCONTINUED | OUTPATIENT
Start: 2023-11-07 | End: 2023-11-07 | Stop reason: HOSPADM

## 2023-11-07 RX ORDER — GLYCOPYRROLATE 0.2 MG/ML
INJECTION, SOLUTION INTRAMUSCULAR; INTRAVENOUS PRN
Status: DISCONTINUED | OUTPATIENT
Start: 2023-11-07 | End: 2023-11-07

## 2023-11-07 RX ORDER — ACETAMINOPHEN 325 MG/1
975 TABLET ORAL ONCE
Status: DISCONTINUED | OUTPATIENT
Start: 2023-11-07 | End: 2023-11-07 | Stop reason: HOSPADM

## 2023-11-07 RX ORDER — CEFAZOLIN SODIUM/WATER 2 G/20 ML
2 SYRINGE (ML) INTRAVENOUS
Status: COMPLETED | OUTPATIENT
Start: 2023-11-07 | End: 2023-11-07

## 2023-11-07 RX ORDER — HYDROMORPHONE HCL IN WATER/PF 6 MG/30 ML
0.4 PATIENT CONTROLLED ANALGESIA SYRINGE INTRAVENOUS EVERY 5 MIN PRN
Status: DISCONTINUED | OUTPATIENT
Start: 2023-11-07 | End: 2023-11-07 | Stop reason: HOSPADM

## 2023-11-07 RX ORDER — FENTANYL CITRATE 50 UG/ML
50 INJECTION, SOLUTION INTRAMUSCULAR; INTRAVENOUS EVERY 5 MIN PRN
Status: DISCONTINUED | OUTPATIENT
Start: 2023-11-07 | End: 2023-11-07 | Stop reason: HOSPADM

## 2023-11-07 RX ORDER — LIDOCAINE 40 MG/G
CREAM TOPICAL
Status: DISCONTINUED | OUTPATIENT
Start: 2023-11-07 | End: 2023-11-07 | Stop reason: HOSPADM

## 2023-11-07 RX ORDER — FENTANYL CITRATE 50 UG/ML
INJECTION, SOLUTION INTRAMUSCULAR; INTRAVENOUS PRN
Status: DISCONTINUED | OUTPATIENT
Start: 2023-11-07 | End: 2023-11-07

## 2023-11-07 RX ORDER — ONDANSETRON 4 MG/1
4 TABLET, ORALLY DISINTEGRATING ORAL EVERY 30 MIN PRN
Status: DISCONTINUED | OUTPATIENT
Start: 2023-11-07 | End: 2023-11-07 | Stop reason: HOSPADM

## 2023-11-07 RX ORDER — HYDRALAZINE HYDROCHLORIDE 20 MG/ML
2.5-5 INJECTION INTRAMUSCULAR; INTRAVENOUS EVERY 10 MIN PRN
Status: DISCONTINUED | OUTPATIENT
Start: 2023-11-07 | End: 2023-11-07 | Stop reason: HOSPADM

## 2023-11-07 RX ORDER — OXYCODONE HYDROCHLORIDE 5 MG/1
5 TABLET ORAL
Status: DISCONTINUED | OUTPATIENT
Start: 2023-11-07 | End: 2023-11-13 | Stop reason: HOSPADM

## 2023-11-07 RX ORDER — ONDANSETRON 4 MG/1
4 TABLET, ORALLY DISINTEGRATING ORAL EVERY 30 MIN PRN
Status: DISCONTINUED | OUTPATIENT
Start: 2023-11-07 | End: 2023-11-13 | Stop reason: HOSPADM

## 2023-11-07 RX ORDER — HYDROMORPHONE HCL IN WATER/PF 6 MG/30 ML
0.2 PATIENT CONTROLLED ANALGESIA SYRINGE INTRAVENOUS EVERY 5 MIN PRN
Status: DISCONTINUED | OUTPATIENT
Start: 2023-11-07 | End: 2023-11-07 | Stop reason: HOSPADM

## 2023-11-07 RX ORDER — FENTANYL CITRATE 50 UG/ML
25 INJECTION, SOLUTION INTRAMUSCULAR; INTRAVENOUS EVERY 5 MIN PRN
Status: DISCONTINUED | OUTPATIENT
Start: 2023-11-07 | End: 2023-11-07 | Stop reason: HOSPADM

## 2023-11-07 RX ORDER — SODIUM CHLORIDE, SODIUM LACTATE, POTASSIUM CHLORIDE, CALCIUM CHLORIDE 600; 310; 30; 20 MG/100ML; MG/100ML; MG/100ML; MG/100ML
INJECTION, SOLUTION INTRAVENOUS CONTINUOUS
Status: DISCONTINUED | OUTPATIENT
Start: 2023-11-07 | End: 2023-11-07 | Stop reason: HOSPADM

## 2023-11-07 RX ORDER — ACETAMINOPHEN 325 MG/1
975 TABLET ORAL ONCE
Status: COMPLETED | OUTPATIENT
Start: 2023-11-07 | End: 2023-11-07

## 2023-11-07 RX ORDER — KETOROLAC TROMETHAMINE 15 MG/ML
15 INJECTION, SOLUTION INTRAMUSCULAR; INTRAVENOUS
Status: DISCONTINUED | OUTPATIENT
Start: 2023-11-07 | End: 2023-11-07 | Stop reason: HOSPADM

## 2023-11-07 RX ORDER — IBUPROFEN 600 MG/1
600 TABLET, FILM COATED ORAL EVERY 6 HOURS PRN
Qty: 30 TABLET | Refills: 0 | Status: SHIPPED | OUTPATIENT
Start: 2023-11-07

## 2023-11-07 RX ORDER — OXYCODONE HYDROCHLORIDE 5 MG/1
10 TABLET ORAL
Status: DISCONTINUED | OUTPATIENT
Start: 2023-11-07 | End: 2023-11-13 | Stop reason: HOSPADM

## 2023-11-07 RX ADMIN — Medication 2 G: at 07:46

## 2023-11-07 RX ADMIN — DEXAMETHASONE SODIUM PHOSPHATE 4 MG: 4 INJECTION, SOLUTION INTRA-ARTICULAR; INTRALESIONAL; INTRAMUSCULAR; INTRAVENOUS; SOFT TISSUE at 07:44

## 2023-11-07 RX ADMIN — PROPOFOL 200 MG: 10 INJECTION, EMULSION INTRAVENOUS at 07:44

## 2023-11-07 RX ADMIN — LIDOCAINE HYDROCHLORIDE 50 MG: 20 INJECTION, SOLUTION INFILTRATION; PERINEURAL at 07:44

## 2023-11-07 RX ADMIN — MIDAZOLAM 2 MG: 1 INJECTION INTRAMUSCULAR; INTRAVENOUS at 07:38

## 2023-11-07 RX ADMIN — GLYCOPYRROLATE 0.2 MG: 0.2 INJECTION, SOLUTION INTRAMUSCULAR; INTRAVENOUS at 07:44

## 2023-11-07 RX ADMIN — ONDANSETRON 4 MG: 2 INJECTION INTRAMUSCULAR; INTRAVENOUS at 07:50

## 2023-11-07 RX ADMIN — SODIUM CHLORIDE, POTASSIUM CHLORIDE, SODIUM LACTATE AND CALCIUM CHLORIDE: 600; 310; 30; 20 INJECTION, SOLUTION INTRAVENOUS at 07:36

## 2023-11-07 RX ADMIN — ACETAMINOPHEN 975 MG: 325 TABLET, FILM COATED ORAL at 06:30

## 2023-11-07 RX ADMIN — FENTANYL CITRATE 100 MCG: 50 INJECTION INTRAMUSCULAR; INTRAVENOUS at 07:44

## 2023-11-07 ASSESSMENT — ACTIVITIES OF DAILY LIVING (ADL)
ADLS_ACUITY_SCORE: 36

## 2023-11-07 NOTE — DISCHARGE INSTRUCTIONS
GENERAL ANESTHESIA OR SEDATION ADULT DISCHARGE INSTRUCTIONS   SPECIAL PRECAUTIONS FOR 24 HOURS AFTER SURGERY    IT IS NOT UNUSUAL TO FEEL LIGHT-HEADED OR FAINT, UP TO 24 HOURS AFTER SURGERY OR WHILE TAKING PAIN MEDICATION.  IF YOU HAVE THESE SYMPTOMS; SIT FOR A FEW MINUTES BEFORE STANDING AND HAVE SOMEONE ASSIST YOU WHEN YOU GET UP TO WALK OR USE THE BATHROOM.    YOU SHOULD REST AND RELAX FOR THE NEXT 24 HOURS AND YOU MUST MAKE ARRANGEMENTS TO HAVE SOMEONE STAY WITH YOU FOR AT LEAST 24 HOURS AFTER YOUR DISCHARGE.  AVOID HAZARDOUS AND STRENUOUS ACTIVITIES.  DO NOT MAKE IMPORTANT DECISIONS FOR 24 HOURS.    DO NOT DRIVE ANY VEHICLE OR OPERATE MECHANICAL EQUIPMENT FOR 24 HOURS FOLLOWING THE END OF YOUR SURGERY.  EVEN THOUGH YOU MAY FEEL NORMAL, YOUR REACTIONS MAY BE AFFECTED BY THE MEDICATION YOU HAVE RECEIVED.    DO NOT DRINK ALCOHOLIC BEVERAGES FOR 24 HOURS FOLLOWING YOUR SURGERY.    DRINK CLEAR LIQUIDS (APPLE JUICE, GINGER ALE, 7-UP, BROTH, ETC.).  PROGRESS TO YOUR REGULAR DIET AS YOU FEEL ABLE.    YOU MAY HAVE A DRY MOUTH, A SORE THROAT, MUSCLES ACHES OR TROUBLE SLEEPING.  THESE SHOULD GO AWAY AFTER 24 HOURS.    CALL YOUR DOCTOR FOR ANY OF THE FOLLOWING:  SIGNS OF INFECTION (FEVER, GROWING TENDERNESS AT THE SURGERY SITE, A LARGE AMOUNT OF DRAINAGE OR BLEEDING, SEVERE PAIN, FOUL-SMELLING DRAINAGE, REDNESS OR SWELLING.    IT HAS BEEN OVER 8 TO 10 HOURS SINCE SURGERY AND YOU ARE STILL NOT ABLE TO URINATE (PASS WATER).     Maximum acetaminophen (Tylenol) dose from all sources should not exceed 4 grams (4000 mg) per day. You received tylenol 975 mg at 6:30 am, do not take tylenol until 12:30 pm.     DILATION AND CURETTAGE    AFTER YOUR DILATION AND CURETTAGE  -  You can expect some cramping for a few hours after the D & C.  This can be controlled with an over-the-counter pain reliever.  -  You may have some light bleeding for a few weeks.  Use pads instead of tampons.  -  Take showers instead of baths for about a week.   Ask your healthcare provider if you should avoid exercising or having sex for a period of time.      WHEN TO CALL YOUR HEALTHCARE PROVIDER    -  Heavy bleeding (more than 1 pad an hour).  -  A fever of 100.4 F (38 C) or higher, or as directed by your healthcare provider.  -  Increasing belly pain, tenderness, or cramping.  -  Foul-smelling discharge.

## 2023-11-07 NOTE — ANESTHESIA POSTPROCEDURE EVALUATION
Patient: Celeste Alcala    Procedure: Procedure(s):  HYSTEROSCOPY, WITH DILATION AND CURETTAGE OF UTERUS USING MORCELLATOR       Anesthesia Type:  General    Note:  Disposition: Outpatient   Postop Pain Control: Uneventful            Sign Out: Well controlled pain   PONV: No   Neuro/Psych: Uneventful            Sign Out: Acceptable/Baseline neuro status   Airway/Respiratory: Uneventful            Sign Out: Acceptable/Baseline resp. status   CV/Hemodynamics: Uneventful            Sign Out: Acceptable CV status; No obvious hypovolemia; No obvious fluid overload   Other NRE: NONE   DID A NON-ROUTINE EVENT OCCUR? No           Last vitals:  Vitals Value Taken Time   /95 11/07/23 0819   Temp     Pulse 78 11/07/23 0821   Resp 11 11/07/23 0821   SpO2 99 % 11/07/23 0821   Vitals shown include unfiled device data.    Electronically Signed By: Austyn Weiner MD  November 7, 2023  8:23 AM

## 2023-11-07 NOTE — ANESTHESIA PREPROCEDURE EVALUATION
Anesthesia Pre-Procedure Evaluation    Patient: Celeste Alcala   MRN: [id number] : 1960        Procedure : Procedure(s):  HYSTEROSCOPY, WITH DILATION AND CURETTAGE OF UTERUS USING MORCELLATOR          Past Medical History:   Diagnosis Date    PONV (postoperative nausea and vomiting)     Unspecified sinusitis (chronic)       Past Surgical History:   Procedure Laterality Date    BIOPSY      melanoma biopsy    COLONOSCOPY  2012    Procedure:COLONOSCOPY;  Colonoscopy   ; Surgeon:BRIAN NAGEL; Location: GI    ENT SURGERY      tonsils    Z NONSPECIFIC PROCEDURE      (R) knee surgery-leg length discrepency    ZZ NONSPECIFIC PROCEDURE      T&A    ZZC NONSPECIFIC PROCEDURE       x 2      Allergies   Allergen Reactions    No Known Drug Allergy       Social History     Tobacco Use    Smoking status: Never    Smokeless tobacco: Never   Substance Use Topics    Alcohol use: Yes     Comment: few drinks per night on weekend      Wt Readings from Last 1 Encounters:   23 66.2 kg (145 lb 14.4 oz)        Anesthesia Evaluation   Pt has had prior anesthetic. Type: General.    History of anesthetic complications  - PONV.      ROS/MED HX  ENT/Pulmonary:  - neg pulmonary ROS     Neurologic:  - neg neurologic ROS     Cardiovascular:       METS/Exercise Tolerance:     Hematologic: Comments: Lab Test        23                       0626          1408          WBC           --          6.4           HGB          12.7         12.0          MCV           --          91            PLT           --          248            Lab Test        23                       1408          1601          0853          NA           140          141          139           POTASSIUM    4.0          4.0          4.1           CHLORIDE     102          108          107           CO2          27           30           26            BUN          14.6         17           13        "     CR           0.64         0.78         0.69          ANIONGAP     11           3            6             JEANNIE          9.2          9.1          8.6           GLC          95           125*         89                  Musculoskeletal:  - neg musculoskeletal ROS     GI/Hepatic:  - neg GI/hepatic ROS     Renal/Genitourinary: Comment: hickened endometrium      Endo:       Psychiatric/Substance Use:  - neg psychiatric ROS     Infectious Disease:  - neg infectious disease ROS     Malignancy:  - neg malignancy ROS     Other:  - neg other ROS          Physical Exam    Airway        Mallampati: II   TM distance: > 3 FB   Neck ROM: full   Mouth opening: > 3 cm    Respiratory Devices and Support         Dental       (+) Minor Abnormalities - some fillings, tiny chips      Cardiovascular   cardiovascular exam normal          Pulmonary   pulmonary exam normal                OUTSIDE LABS:  CBC:   Lab Results   Component Value Date    WBC 6.4 06/19/2023    WBC 7.2 11/25/2005    HGB 12.7 11/07/2023    HGB 12.0 06/19/2023    HCT 37.7 06/19/2023    HCT 41.3 11/25/2005     06/19/2023     11/25/2005     BMP:   Lab Results   Component Value Date     06/19/2023     04/11/2022    POTASSIUM 4.0 06/19/2023    POTASSIUM 4.0 04/11/2022    CHLORIDE 102 06/19/2023    CHLORIDE 108 04/11/2022    CO2 27 06/19/2023    CO2 30 04/11/2022    BUN 14.6 06/19/2023    BUN 17 04/11/2022    CR 0.64 06/19/2023    CR 0.78 04/11/2022    GLC 95 06/19/2023     (H) 04/11/2022     COAGS: No results found for: \"PTT\", \"INR\", \"FIBR\"  POC: No results found for: \"BGM\", \"HCG\", \"HCGS\"  HEPATIC:   Lab Results   Component Value Date    ALBUMIN 4.0 04/11/2022    PROTTOTAL 7.0 04/11/2022    ALT 24 04/11/2022    AST 15 04/11/2022    ALKPHOS 88 04/11/2022    BILITOTAL 0.3 04/11/2022     OTHER:   Lab Results   Component Value Date    JEANNIE 9.2 06/19/2023    TSH 1.01 03/21/2007       Anesthesia Plan    ASA Status:  2       Anesthesia Type: " General.     - Airway: LMA   Induction: Intravenous, Propofol.   Maintenance: Balanced.        Consents    Anesthesia Plan(s) and associated risks, benefits, and realistic alternatives discussed. Questions answered and patient/representative(s) expressed understanding.     - Discussed:     - Discussed with:  Patient      - Extended Intubation/Ventilatory Support Discussed: No.      - Patient is DNR/DNI Status: No     Use of blood products discussed: No .     Postoperative Care    Pain management: IV analgesics.   PONV prophylaxis: Dexamethasone or Solumedrol, Ondansetron (or other 5HT-3)     Comments:                Austyn Weiner MD

## 2023-11-07 NOTE — OP NOTE
Operative Note    PREOPERATIVE DIAGNOSIS: Thickened endometrium.   POSTOPERATIVE DIAGNOSIS: Endometrial polyps.   PROCEDURE: Hysteroscopic endometrial polypectomies and endometrial curettage with intrauterine morcellator.   SURGEON: Cade Ray MD   ANESTHESIA: General endotracheal anesthesia.   FLUIDS: 600 cc lactated Ringer's, Ancef 2 gm and uterine distention medium was normal saline with 145 cc deficit total.   ESTIMATED BLOOD LOSS: 3 cc   DRAINS: None.     INDICATIONS FOR PROCEDURE: Patient is a 63 year old woman who has had a recent pelvic ultrasound for pelvic pain that incidentally showed a thickened endometrial stripe with cystic areas.  The patient underwent an office endometrial biopsy which showed benign endometrium. Therefore, the patient presents today for operative hysteroscopy to resect any anatomic lesions as well as a dilatation and curettage to assess the endometrium, confirm histologic diagnosis, and hopefully resolve her symptoms. The patient understands the indication for the procedure as well as the potential risks such as bleeding, infection, injury to the cervix, uterus, tubes, ovaries, bowel, bladder, any other intra-abdominal or pelvic organs as well as the risk of fluid overload and electrolyte imbalance that can occur in an operative hysteroscopy. She accepts all these risks, as well as the risks of anesthesia including aspiration, malignant hyperthermia and death. She has given informed consent.     FINDINGS: On pelvic exam under anesthesia, the uterus is normal size, shape, contour, anteverted. There are no adnexal masses. On hysteroscopy, there are 2 sessile polypoid structures on anterior uterine wall. Both tubal ostia appeared normal.   SPECIMENS: 1) Endocervical curetting, 2) Endometrial polyps and curetting.   COMPLICATIONS: None.     PROCEDURE IN DETAIL: After proper patient identification and consent for procedure was obtained, the patient was taken to the operating room  where adequate general endotracheal anesthesia was obtained. The patient was placed in the dorsal lithotomy position with legs in Husam stirrups and pelvic exam under anesthesia was performed with the above-noted findings. She was prepped and draped in the usual sterile manner for this procedure. A single-arm speculum was placed in the vagina to visualize the cervix, which was then grasped at 12 o'clock with a single-tooth tenaculum for downward traction of the uterus. An endocervical curettage was performed, and specimen sent to pathology. Using Hegar dilators, the cervix was gently dilated to a #6 Hegar dilator without difficulty. The Myosure operative hysteroscope was inserted into the endometrial cavity without difficulty under direct visualization.  Normal saline was used under continuous flow as a uterine distention medium. Fluid management and volumes were monitored with the Ziftit fluid management system. The above-noted findings were ascertained. Using the Myosure intrauterine morcellator, the above noted polyps were resected without difficulty. A sharp curettage was performed with the Myosure throughout all quadrants, and specimen was sent to pathology. The saline was evacuated from the uterus and the scope was removed. The tenaculum was removed and the cervix and uterus were hemostatic at the end.   Patient tolerated the procedure well. Sponge and instrument counts were correct x2. The patient was taken to the recovery room and extubated in stable condition.     Cade Ray MD

## 2023-11-07 NOTE — ANESTHESIA CARE TRANSFER NOTE
Patient: Celeste Alcala    Procedure: Procedure(s):  HYSTEROSCOPY, WITH DILATION AND CURETTAGE OF UTERUS USING MORCELLATOR       Diagnosis: Thickened endometrium [R93.89]  Diagnosis Additional Information: No value filed.    Anesthesia Type:   General     Note:    Oropharynx: oropharynx clear of all foreign objects  Level of Consciousness: awake  Oxygen Supplementation: face mask    Independent Airway: airway patency satisfactory and stable  Dentition: dentition unchanged  Vital Signs Stable: post-procedure vital signs reviewed and stable    Patient transferred to: PACU    Handoff Report: Identifed the Patient, Identified the Reponsible Provider, Reviewed the pertinent medical history, Discussed the surgical course, Reviewed Intra-OP anesthesia mangement and issues during anesthesia, Set expectations for post-procedure period and Allowed opportunity for questions and acknowledgement of understanding      Vitals:  Vitals Value Taken Time   /95 11/07/23 0819   Temp     Pulse 78 11/07/23 0821   Resp 11 11/07/23 0821   SpO2 99 % 11/07/23 0821   Vitals shown include unfiled device data.    Electronically Signed By: MARCO A Smith CRNA  November 7, 2023  8:22 AM   normal

## 2023-11-08 LAB
PATH REPORT.COMMENTS IMP SPEC: NORMAL
PATH REPORT.FINAL DX SPEC: NORMAL
PATH REPORT.GROSS SPEC: NORMAL
PATH REPORT.MICROSCOPIC SPEC OTHER STN: NORMAL
PATH REPORT.RELEVANT HX SPEC: NORMAL
PHOTO IMAGE: NORMAL

## 2023-11-08 ASSESSMENT — ACTIVITIES OF DAILY LIVING (ADL)
ADLS_ACUITY_SCORE: 36

## 2023-11-09 ASSESSMENT — ACTIVITIES OF DAILY LIVING (ADL)
ADLS_ACUITY_SCORE: 36

## 2023-11-10 ASSESSMENT — ACTIVITIES OF DAILY LIVING (ADL)
ADLS_ACUITY_SCORE: 36

## 2023-11-11 ASSESSMENT — ACTIVITIES OF DAILY LIVING (ADL)
ADLS_ACUITY_SCORE: 36

## 2023-11-12 ASSESSMENT — ACTIVITIES OF DAILY LIVING (ADL)
ADLS_ACUITY_SCORE: 36

## 2023-12-18 ENCOUNTER — HOSPITAL ENCOUNTER (OUTPATIENT)
Dept: GENERAL RADIOLOGY | Facility: CLINIC | Age: 63
Discharge: HOME | End: 2023-12-18
Attending: OTOLARYNGOLOGY | Admitting: OTOLARYNGOLOGY
Payer: COMMERCIAL

## 2023-12-18 DIAGNOSIS — R09.89 THROAT CLEARING: ICD-10-CM

## 2023-12-18 PROCEDURE — 250N000013 HC RX MED GY IP 250 OP 250 PS 637: Performed by: OTOLARYNGOLOGY

## 2023-12-18 PROCEDURE — 74221 X-RAY XM ESOPHAGUS 2CNTRST: CPT

## 2023-12-18 RX ADMIN — ANTACID/ANTIFLATULENT 4 G: 380; 550; 10; 10 GRANULE, EFFERVESCENT ORAL at 08:45

## 2023-12-20 ENCOUNTER — TRANSFERRED RECORDS (OUTPATIENT)
Dept: HEALTH INFORMATION MANAGEMENT | Facility: CLINIC | Age: 63
End: 2023-12-20
Payer: COMMERCIAL

## 2023-12-28 ENCOUNTER — MYC MEDICAL ADVICE (OUTPATIENT)
Dept: PEDIATRICS | Facility: CLINIC | Age: 63
End: 2023-12-28
Payer: COMMERCIAL

## 2023-12-28 DIAGNOSIS — K22.4 ESOPHAGEAL DYSFUNCTION: Primary | ICD-10-CM

## 2024-05-01 ENCOUNTER — PATIENT OUTREACH (OUTPATIENT)
Dept: CARE COORDINATION | Facility: CLINIC | Age: 64
End: 2024-05-01
Payer: COMMERCIAL

## 2024-05-29 ENCOUNTER — PATIENT OUTREACH (OUTPATIENT)
Dept: CARE COORDINATION | Facility: CLINIC | Age: 64
End: 2024-05-29
Payer: COMMERCIAL

## 2024-06-03 ENCOUNTER — ANCILLARY PROCEDURE (OUTPATIENT)
Dept: MAMMOGRAPHY | Facility: CLINIC | Age: 64
End: 2024-06-03
Attending: INTERNAL MEDICINE
Payer: COMMERCIAL

## 2024-06-03 DIAGNOSIS — Z12.31 VISIT FOR SCREENING MAMMOGRAM: ICD-10-CM

## 2024-06-03 PROCEDURE — 77067 SCR MAMMO BI INCL CAD: CPT | Mod: TC | Performed by: RADIOLOGY

## 2024-06-03 PROCEDURE — 77063 BREAST TOMOSYNTHESIS BI: CPT | Mod: TC | Performed by: RADIOLOGY

## 2024-06-25 ENCOUNTER — PATIENT OUTREACH (OUTPATIENT)
Dept: CARE COORDINATION | Facility: CLINIC | Age: 64
End: 2024-06-25
Payer: COMMERCIAL

## 2024-07-09 ENCOUNTER — PATIENT OUTREACH (OUTPATIENT)
Dept: CARE COORDINATION | Facility: CLINIC | Age: 64
End: 2024-07-09
Payer: COMMERCIAL

## 2024-08-13 ENCOUNTER — MYC MEDICAL ADVICE (OUTPATIENT)
Dept: PEDIATRICS | Facility: CLINIC | Age: 64
End: 2024-08-13
Payer: COMMERCIAL

## 2024-08-13 DIAGNOSIS — Z13.220 SCREENING FOR LIPID DISORDERS: ICD-10-CM

## 2024-08-13 DIAGNOSIS — Z13.1 SCREENING FOR DIABETES MELLITUS: Primary | ICD-10-CM

## 2024-08-25 ENCOUNTER — HEALTH MAINTENANCE LETTER (OUTPATIENT)
Age: 64
End: 2024-08-25

## 2024-09-19 ENCOUNTER — LAB (OUTPATIENT)
Dept: LAB | Facility: CLINIC | Age: 64
End: 2024-09-19
Payer: COMMERCIAL

## 2024-09-19 DIAGNOSIS — Z13.1 SCREENING FOR DIABETES MELLITUS: ICD-10-CM

## 2024-09-19 DIAGNOSIS — Z13.220 SCREENING FOR LIPID DISORDERS: ICD-10-CM

## 2024-09-19 LAB
CHOLEST SERPL-MCNC: 198 MG/DL
EST. AVERAGE GLUCOSE BLD GHB EST-MCNC: 103 MG/DL
FASTING STATUS PATIENT QL REPORTED: YES
HBA1C MFR BLD: 5.2 % (ref 0–5.6)
HDLC SERPL-MCNC: 65 MG/DL
LDLC SERPL CALC-MCNC: 117 MG/DL
NONHDLC SERPL-MCNC: 133 MG/DL
TRIGL SERPL-MCNC: 82 MG/DL

## 2024-09-19 PROCEDURE — 83036 HEMOGLOBIN GLYCOSYLATED A1C: CPT

## 2024-09-19 PROCEDURE — 36415 COLL VENOUS BLD VENIPUNCTURE: CPT

## 2024-09-19 PROCEDURE — 80061 LIPID PANEL: CPT

## 2024-11-21 SDOH — HEALTH STABILITY: PHYSICAL HEALTH: ON AVERAGE, HOW MANY MINUTES DO YOU ENGAGE IN EXERCISE AT THIS LEVEL?: 50 MIN

## 2024-11-21 SDOH — HEALTH STABILITY: PHYSICAL HEALTH: ON AVERAGE, HOW MANY DAYS PER WEEK DO YOU ENGAGE IN MODERATE TO STRENUOUS EXERCISE (LIKE A BRISK WALK)?: 7 DAYS

## 2024-11-21 ASSESSMENT — SOCIAL DETERMINANTS OF HEALTH (SDOH): HOW OFTEN DO YOU GET TOGETHER WITH FRIENDS OR RELATIVES?: TWICE A WEEK

## 2024-11-26 ENCOUNTER — OFFICE VISIT (OUTPATIENT)
Dept: PEDIATRICS | Facility: CLINIC | Age: 64
End: 2024-11-26
Payer: COMMERCIAL

## 2024-11-26 VITALS
HEIGHT: 67 IN | TEMPERATURE: 98 F | RESPIRATION RATE: 18 BRPM | WEIGHT: 144.8 LBS | SYSTOLIC BLOOD PRESSURE: 121 MMHG | BODY MASS INDEX: 22.73 KG/M2 | HEART RATE: 82 BPM | DIASTOLIC BLOOD PRESSURE: 77 MMHG | OXYGEN SATURATION: 98 %

## 2024-11-26 DIAGNOSIS — H81.09 MENIERE'S DISEASE, UNSPECIFIED LATERALITY: ICD-10-CM

## 2024-11-26 DIAGNOSIS — Z00.00 ROUTINE GENERAL MEDICAL EXAMINATION AT A HEALTH CARE FACILITY: Primary | ICD-10-CM

## 2024-11-26 PROBLEM — R93.89 THICKENED ENDOMETRIUM: Status: RESOLVED | Noted: 2023-10-04 | Resolved: 2024-11-26

## 2024-11-26 PROCEDURE — 90673 RIV3 VACCINE NO PRESERV IM: CPT | Performed by: INTERNAL MEDICINE

## 2024-11-26 PROCEDURE — 99396 PREV VISIT EST AGE 40-64: CPT | Mod: 25 | Performed by: INTERNAL MEDICINE

## 2024-11-26 PROCEDURE — 90471 IMMUNIZATION ADMIN: CPT | Performed by: INTERNAL MEDICINE

## 2024-11-26 ASSESSMENT — PAIN SCALES - GENERAL: PAINLEVEL_OUTOF10: NO PAIN (0)

## 2024-11-26 NOTE — PATIENT INSTRUCTIONS
Patient Education     Let me know if you want to try PT or seeing a specialist for the left hip issue.  Preventive Care Advice   This is general advice given by our system to help you stay healthy. However, your care team may have specific advice just for you. Please talk to your care team about your preventive care needs.  Nutrition  Eat 5 or more servings of fruits and vegetables each day.  Try wheat bread, brown rice and whole grain pasta (instead of white bread, rice, and pasta).  Get enough calcium and vitamin D. Check the label on foods and aim for 100% of the RDA (recommended daily allowance).  Lifestyle  Exercise at least 150 minutes each week  (30 minutes a day, 5 days a week).  Do muscle strengthening activities 2 days a week. These help control your weight and prevent disease.  No smoking.  Wear sunscreen to prevent skin cancer.  Have a dental exam and cleaning every 6 months.  Yearly exams  See your health care team every year to talk about:  Any changes in your health.  Any medicines your care team has prescribed.  Preventive care, family planning, and ways to prevent chronic diseases.  Shots (vaccines)   COVID-19 shot: Get this shot when it's due.  Flu shot: Get a flu shot every year.  Tetanus shot: Get a tetanus shot every 10 years.    General health tests  Diabetes screening:  Starting at age 35, Get screened for diabetes at least every 3 years.  If you are younger than age 35, ask your care team if you should be screened for diabetes.  Cholesterol test: At age 39, start having a cholesterol test every 5 years, or more often if advised.  Bone density scan (DEXA): At age 50, ask your care team if you should have this scan for osteoporosis (brittle bones).  Hepatitis C: Get tested at least once in your life.  STIs (sexually transmitted infections)  Before age 24: Ask your care team if you should be screened for STIs.  After age 24: Get screened for STIs if you're at risk. You are at risk for STIs  (including HIV) if:  You are sexually active with more than one person.  You don't use condoms every time.  You or a partner was diagnosed with a sexually transmitted infection.  If you are at risk for HIV, ask about PrEP medicine to prevent HIV.  Get tested for HIV at least once in your life, whether you are at risk for HIV or not.  Cancer screening tests  Cervical cancer screening: If you have a cervix, begin getting regular cervical cancer screening tests starting at age 21.  Breast cancer scan (mammogram): If you've ever had breasts, begin having regular mammograms starting at age 40. This is a scan to check for breast cancer.  Colon cancer screening: It is important to start screening for colon cancer at age 45.  Have a colonoscopy test every 10 years (or more often if you're at risk) Or, ask your provider about stool tests like a FIT test every year or Cologuard test every 3 years.  To learn more about your testing options, visit:   .  For help making a decision, visit:   https://bit.ly/nc29287.  Prostate cancer screening test: If you have a prostate, ask your care team if a prostate cancer screening test (PSA) at age 55 is right for you.  Lung cancer screening: If you are a current or former smoker ages 50 to 80, ask your care team if ongoing lung cancer screenings are right for you.  For informational purposes only. Not to replace the advice of your health care provider. Copyright   2023 [employer]. All rights reserved. Clinically reviewed by the [facility]. [employer] 566241 - REV 01/24.

## 2024-11-26 NOTE — PROGRESS NOTES
Preventive Care Visit  [facility] MIRA Fisher MD, Internal Medicine  Nov 26, 2024  {Provider  Link to [facility] :117548}    {PROVIDER CHARTING PREFERENCE:261348}    Rafaela Locke is a 64 year old, presenting for the following:  Annual Visit        11/26/2024     2:38 PM   Additional Questions   Roomed by ana   Accompanied by angela         11/26/2024     2:38 PM   Patient Reported Additional Medications   Patient reports taking the following new medications na          HPI  ***  {MA/LPN/RN Pre-Provider Visit Orders- hCG/UA/Strep (Optional):964628}  {SUPERLIST (Optional):931631}  {additonal problems for provider to add (Optional):023149}  Health Care Directive  Patient does not have a Health Care Directive: {ADVANCE_DIRECTIVE_STATUS:533797}      11/21/2024   General Health   How would you rate your overall physical health? Good   Feel stress (tense, anxious, or unable to sleep) Only a little      (!) STRESS CONCERN      11/21/2024   Nutrition   Three or more servings of calcium each day? Yes   Diet: Regular (no restrictions)   How many servings of fruit and vegetables per day? (!) 2-3   How many sweetened beverages each day? 0-1            11/21/2024   Exercise   Days per week of moderate/strenous exercise 7 days   Average minutes spent exercising at this level 50 min            11/21/2024   Social Factors   Frequency of gathering with friends or relatives Twice a week   Worry food won't last until get money to buy more No   Food not last or not have enough money for food? No   Do you have housing? (Housing is defined as stable permanent housing and does not include staying ouside in a car, in a tent, in an abandoned building, in an overnight shelter, or couch-surfing.) Yes   Are you worried about losing your housing? No   Lack of transportation? No   Unable to get utilities (heat,electricity)? No            11/21/2024   Fall Risk   Fallen 2 or more times in the past year? No    Trouble  with walking or balance? No        Patient-reported          11/21/2024   Dental   Dentist two times every year? Yes            11/21/2024   TB Screening   Were you born outside of the [place]? Yes            Today's PHQ-2 Score:       11/25/2024     6:36 PM   PHQ-2 ( 1999 Pfizer)   Q1: Little interest or pleasure in doing things 0    Q2: Feeling down, depressed or hopeless 0    PHQ-2 Score 0    Q1: Little interest or pleasure in doing things Not at all   Q2: Feeling down, depressed or hopeless Not at all   PHQ-2 Score 0       Patient-reported           11/21/2024   Substance Use   Alcohol more than 3/day or more than 7/wk No   Do you use any other substances recreationally? No        Social History     Tobacco Use    Smoking status: Never     Passive exposure: Past    Smokeless tobacco: Never   Vaping Use    Vaping status: Never Used   Substance Use Topics    Alcohol use: Yes     Comment: few drinks per night on weekend    Drug use: No     {Provider  If there are gaps in the social history shown above, please follow the link to update and then refresh the note Link to Social and Substance History :657503}      4/13/2022   LAST FHS-7 RESULTS   1st degree relative breast or ovarian cancer No   Any relative bilateral breast cancer No   Any male have breast cancer No   Any ONE woman have BOTH breast AND ovarian cancer No   Any woman with breast cancer before 50yrs No   2 or more relatives with breast AND/OR ovarian cancer No   2 or more relatives with breast AND/OR bowel cancer No        {If any of the questions to the FHS7 are answered yes, consider referral for genetic counseling.    Additional indications for genetic referral include personal history of breast or ovarian cancer, genetic mutation in 1st degree relative which increases risk of breast cancer including BRCA1, BRCA2, ABBIE, PALB 2, TP53, CHEK2, PTEN, CDH1, STK11 (per ACS) and/or 1st degree relative with history of pancreatic or high-risk prostate cancer (per  "NCCN):125459}   {Mammogram Decision Support (Optional):609699}        11/21/2024   STI Screening   New sexual partner(s) since last STI/HIV test? No        History of abnormal Pap smear: { :090473}        Latest Ref Rng & Units 7/25/2023     2:45 PM 4/3/2018     6:43 PM 4/3/2018     6:05 PM   PAP / HPV   PAP  Negative for Intraepithelial Lesion or Malignancy (NILM)      PAP (Historical)    NIL    HPV 16 DNA Negative Negative  Negative     HPV 18 DNA Negative Negative  Negative     Other HR HPV Negative Negative  Negative       ASCVD Risk   The 10-year ASCVD risk score (Miguel A WATTERS, et al., 2019) is: 3.3%    Values used to calculate the score:      Age: 64 years      Sex: Female      Is Non- : No      Diabetic: No      Tobacco smoker: No      Systolic Blood Pressure: 121 mmHg      Is BP treated: No      HDL Cholesterol: 65 mg/dL      Total Cholesterol: 198 mg/dL    {Link to Fracture Risk Assessment Tool (Optional):613010}    {Provider  REQUIRED FOR AWV Use the storyboard to review patient history, after sections have been marked as reviewed, refresh note to capture documentation:959278}   Reviewed and updated as needed this visit by Provider                    {HISTORY OPTIONS (Optional):106558}    {ROS Picklists (Optional):992299}     Objective    Exam  /77 (BP Location: Right arm, Patient Position: Sitting, Cuff Size: Adult Regular)   Pulse 82   Temp 98  F (36.7  C) (Tympanic)   Resp 18   Ht 1.702 m (5' 7\")   Wt 65.7 kg (144 lb 12.8 oz)   LMP 09/20/2015   SpO2 98%   BMI 22.68 kg/m     Estimated body mass index is 22.68 kg/m  as calculated from the following:    Height as of this encounter: 1.702 m (5' 7\").    Weight as of this encounter: 65.7 kg (144 lb 12.8 oz).    Physical Exam  {Exam Choices (Optional):306341}        Signed Electronically by: Alicia Fisher MD  {Email feedback regarding this note to [email or web address]   :942380}  "

## 2024-11-26 NOTE — PROGRESS NOTES
Preventive Care Visit  [facility] MIRA Fisher MD, Internal Medicine  Nov 26, 2024      Assessment & Plan     Routine general medical examination at a health care facility  Routine health education discussed: calcium, diet, exercise, weight, safety.   Continue derm follow-up yearly and sunscreen.  Notify if wants PT referral    Meniere's disease, unspecified laterality  Stable, follow-up with ENT yearly            Counseling  Appropriate preventive services were addressed with this patient via screening, questionnaire, or discussion as appropriate for fall prevention, nutrition, physical activity, Tobacco-use cessation, social engagement, weight loss and cognition.  Checklist reviewing preventive services available has been given to the patient.  Reviewed patient's diet, addressing concerns and/or questions.       See Patient Instructions    Rafaela Locke is a 64 year old, presenting for the following:  Annual Visit        11/26/2024     2:38 PM   Additional Questions   Roomed by ana   Accompanied by angela         11/26/2024     2:38 PM   Patient Reported Additional Medications   Patient reports taking the following new medications angela BRODY  Wondering if has a mild hernia.  When walking up a hill and steps with left leg will have pain in groin but it hold it then it will go away.  No bulge noted.      Wears sunscreen and sees derm yearly    Vertigo - rare, keeps meclizine on hand but don't need refill.  Hearing periodically down.    Microhematuria - for 30 years.      Health Care Directive  Patient does not have a Health Care Directive: Patient states has Advance Directive and will bring in a copy to clinic.      11/21/2024   General Health   How would you rate your overall physical health? Good   Feel stress (tense, anxious, or unable to sleep) Only a little      (!) STRESS CONCERN      11/21/2024   Nutrition   Three or more servings of calcium each day? Yes   Diet: Regular (no  restrictions)   How many servings of fruit and vegetables per day? (!) 2-3   How many sweetened beverages each day? 0-1            11/21/2024   Exercise   Days per week of moderate/strenous exercise 7 days   Average minutes spent exercising at this level 50 min            11/21/2024   Social Factors   Frequency of gathering with friends or relatives Twice a week   Worry food won't last until get money to buy more No   Food not last or not have enough money for food? No   Do you have housing? (Housing is defined as stable permanent housing and does not include staying ouside in a car, in a tent, in an abandoned building, in an overnight shelter, or couch-surfing.) Yes   Are you worried about losing your housing? No   Lack of transportation? No   Unable to get utilities (heat,electricity)? No            11/21/2024   Fall Risk   Fallen 2 or more times in the past year? No    Trouble with walking or balance? No        Patient-reported          11/21/2024   Dental   Dentist two times every year? Yes            11/21/2024   TB Screening   Were you born outside of the [place]? No        Today's PHQ-2 Score:       11/25/2024     6:36 PM   PHQ-2 ( 1999 Pfizer)   Q1: Little interest or pleasure in doing things 0    Q2: Feeling down, depressed or hopeless 0    PHQ-2 Score 0    Q1: Little interest or pleasure in doing things Not at all   Q2: Feeling down, depressed or hopeless Not at all   PHQ-2 Score 0       Patient-reported           11/21/2024   Substance Use   Alcohol more than 3/day or more than 7/wk No   Do you use any other substances recreationally? No        Social History     Tobacco Use    Smoking status: Never     Passive exposure: Past    Smokeless tobacco: Never   Vaping Use    Vaping status: Never Used   Substance Use Topics    Alcohol use: Yes     Comment: few drinks per night on weekend    Drug use: No           4/13/2022   LAST FHS-7 RESULTS   1st degree relative breast or ovarian cancer No   Any relative bilateral  "breast cancer No   Any male have breast cancer No   Any ONE woman have BOTH breast AND ovarian cancer No   Any woman with breast cancer before 50yrs No   2 or more relatives with breast AND/OR ovarian cancer No   2 or more relatives with breast AND/OR bowel cancer No           Mammogram Screening - Mammogram every 1-2 years updated in Health Maintenance based on mutual decision making        11/21/2024   STI Screening   New sexual partner(s) since last STI/HIV test? No        History of abnormal Pap smear: No - age 30- 64 PAP with HPV every 5 years recommended        Latest Ref Rng & Units 7/25/2023     2:45 PM 4/3/2018     6:43 PM 4/3/2018     6:05 PM   PAP / HPV   PAP  Negative for Intraepithelial Lesion or Malignancy (NILM)      PAP (Historical)    NIL    HPV 16 DNA Negative Negative  Negative     HPV 18 DNA Negative Negative  Negative     Other HR HPV Negative Negative  Negative       ASCVD Risk   The 10-year ASCVD risk score (Miguel A WATTERS, et al., 2019) is: 3.3%    Values used to calculate the score:      Age: 64 years      Sex: Female      Is Non- : No      Diabetic: No      Tobacco smoker: No      Systolic Blood Pressure: 121 mmHg      Is BP treated: No      HDL Cholesterol: 65 mg/dL      Total Cholesterol: 198 mg/dL           Reviewed and updated as needed this visit by Provider     Meds  Problems    Fam Hx            Labs reviewed in EPIC         Objective    Exam  /77 (BP Location: Right arm, Patient Position: Sitting, Cuff Size: Adult Regular)   Pulse 82   Temp 98  F (36.7  C) (Tympanic)   Resp 18   Ht 1.702 m (5' 7\")   Wt 65.7 kg (144 lb 12.8 oz)   LMP 09/20/2015   SpO2 98%   BMI 22.68 kg/m     Estimated body mass index is 22.68 kg/m  as calculated from the following:    Height as of this encounter: 1.702 m (5' 7\").    Weight as of this encounter: 65.7 kg (144 lb 12.8 oz).    Physical Exam  GENERAL: alert and no distress  EYES: Eyes grossly normal to " inspection, PERRL and conjunctivae and sclerae normal  HENT: ear canals and TM's normal, nose and mouth without ulcers or lesions  NECK: no adenopathy, no asymmetry, masses, or scars  RESP: lungs clear to auscultation - no rales, rhonchi or wheezes  CV: regular rate and rhythm, normal S1 S2, no S3 or S4, no murmur, click or rub, no peripheral edema  ABDOMEN: soft, nontender, no hepatosplenomegaly, no masses and bowel sounds normal  MS: no gross musculoskeletal defects noted, no edema  SKIN: no suspicious lesions or rashes  NEURO: Normal strength and tone, mentation intact and speech normal  PSYCH: mentation appears normal, affect normal/bright        Signed Electronically by: Alicia Fisher MD

## 2025-05-05 ENCOUNTER — PATIENT OUTREACH (OUTPATIENT)
Dept: CARE COORDINATION | Facility: CLINIC | Age: 65
End: 2025-05-05
Payer: COMMERCIAL

## 2025-06-07 ENCOUNTER — MYC MEDICAL ADVICE (OUTPATIENT)
Dept: PEDIATRICS | Facility: CLINIC | Age: 65
End: 2025-06-07
Payer: COMMERCIAL

## 2025-06-09 ENCOUNTER — E-VISIT (OUTPATIENT)
Dept: PEDIATRICS | Facility: CLINIC | Age: 65
End: 2025-06-09
Payer: COMMERCIAL

## 2025-06-09 DIAGNOSIS — L65.9 HAIR LOSS: Primary | ICD-10-CM

## 2025-06-09 DIAGNOSIS — R63.5 WEIGHT GAIN: ICD-10-CM

## 2025-06-09 NOTE — TELEPHONE ENCOUNTER
Please see patient MyChart message  -states is experiencing symptoms of thyroid imbalance  -requesting thyroid labs    Per chart review  -no previous diagnosis  -LOV 11/26/24    RN recommended E-visit (per clinic policy) in order to address patient request for lab orders    Ashley Negro RN

## 2025-06-09 NOTE — TELEPHONE ENCOUNTER
Provider E-Visit time total (minutes): 5 minutes        TSH   Date Value Ref Range Status   03/21/2007 1.01 0.4 - 5.0 mU/L Final

## 2025-06-11 ENCOUNTER — RESULTS FOLLOW-UP (OUTPATIENT)
Dept: PEDIATRICS | Facility: CLINIC | Age: 65
End: 2025-06-11

## 2025-06-11 ENCOUNTER — LAB (OUTPATIENT)
Dept: LAB | Facility: CLINIC | Age: 65
End: 2025-06-11
Payer: COMMERCIAL

## 2025-06-11 ENCOUNTER — ANCILLARY PROCEDURE (OUTPATIENT)
Dept: MAMMOGRAPHY | Facility: CLINIC | Age: 65
End: 2025-06-11
Attending: INTERNAL MEDICINE
Payer: COMMERCIAL

## 2025-06-11 DIAGNOSIS — Z12.31 VISIT FOR SCREENING MAMMOGRAM: ICD-10-CM

## 2025-06-11 DIAGNOSIS — L65.9 HAIR LOSS: ICD-10-CM

## 2025-06-11 DIAGNOSIS — R63.5 WEIGHT GAIN: ICD-10-CM

## 2025-06-11 LAB
ERYTHROCYTE [DISTWIDTH] IN BLOOD BY AUTOMATED COUNT: 12.9 % (ref 10–15)
FERRITIN SERPL-MCNC: 74 NG/ML (ref 11–328)
HCT VFR BLD AUTO: 39.2 % (ref 35–47)
HGB BLD-MCNC: 12.9 G/DL (ref 11.7–15.7)
IRON BINDING CAPACITY (ROCHE): 272 UG/DL (ref 240–430)
IRON SATN MFR SERPL: 23 % (ref 15–46)
IRON SERPL-MCNC: 62 UG/DL (ref 37–145)
MCH RBC QN AUTO: 29.9 PG (ref 26.5–33)
MCHC RBC AUTO-ENTMCNC: 32.9 G/DL (ref 31.5–36.5)
MCV RBC AUTO: 91 FL (ref 78–100)
PLATELET # BLD AUTO: 301 10E3/UL (ref 150–450)
RBC # BLD AUTO: 4.31 10E6/UL (ref 3.8–5.2)
TSH SERPL DL<=0.005 MIU/L-ACNC: 1.8 UIU/ML (ref 0.3–4.2)
WBC # BLD AUTO: 6.1 10E3/UL (ref 4–11)

## 2025-06-11 PROCEDURE — 82728 ASSAY OF FERRITIN: CPT

## 2025-06-11 PROCEDURE — 77063 BREAST TOMOSYNTHESIS BI: CPT | Mod: TC | Performed by: RADIOLOGY

## 2025-06-11 PROCEDURE — 77067 SCR MAMMO BI INCL CAD: CPT | Mod: TC | Performed by: RADIOLOGY

## 2025-06-11 PROCEDURE — 83550 IRON BINDING TEST: CPT

## 2025-06-11 PROCEDURE — 84443 ASSAY THYROID STIM HORMONE: CPT

## 2025-06-11 PROCEDURE — 36415 COLL VENOUS BLD VENIPUNCTURE: CPT

## 2025-06-11 PROCEDURE — 83540 ASSAY OF IRON: CPT

## 2025-06-11 PROCEDURE — 85027 COMPLETE CBC AUTOMATED: CPT

## (undated) DEVICE — Device

## (undated) DEVICE — BAG CLEAR TRASH 1.3M 39X33" P4040C

## (undated) DEVICE — GLOVE BIOGEL PI MICRO INDICATOR UNDERGLOVE SZ 6.5 48965

## (undated) DEVICE — SOL NACL 0.9% IRRIG 3000ML BAG 2B7477

## (undated) DEVICE — BASIN SET MINOR DISP

## (undated) DEVICE — KIT PROCEDURE FLUENT IN/OUT FLOWPAK TISS TRAP FLT-112S

## (undated) DEVICE — PACK MINOR LITHOTOMY RIDGES

## (undated) DEVICE — LINEN DRAPE 54X72" 5467

## (undated) DEVICE — LINEN TOWEL PACK X10 5473

## (undated) DEVICE — SEAL SET MYOSURE ROD LENS SCOPE SINGLE USE 40-902

## (undated) DEVICE — DRAPE UNDER BUTTOCK 89415

## (undated) DEVICE — LINEN HALF SHEET 5512

## (undated) DEVICE — PREP CHLORHEXIDINE 4% 4OZ (HIBICLENS) 57504

## (undated) RX ORDER — GLYCOPYRROLATE 0.2 MG/ML
INJECTION INTRAMUSCULAR; INTRAVENOUS
Status: DISPENSED
Start: 2023-11-07

## (undated) RX ORDER — LIDOCAINE HYDROCHLORIDE 10 MG/ML
INJECTION, SOLUTION EPIDURAL; INFILTRATION; INTRACAUDAL; PERINEURAL
Status: DISPENSED
Start: 2023-11-07

## (undated) RX ORDER — PROPOFOL 10 MG/ML
INJECTION, EMULSION INTRAVENOUS
Status: DISPENSED
Start: 2023-11-07

## (undated) RX ORDER — ONDANSETRON 2 MG/ML
INJECTION INTRAMUSCULAR; INTRAVENOUS
Status: DISPENSED
Start: 2023-11-07

## (undated) RX ORDER — ACETAMINOPHEN 325 MG/1
TABLET ORAL
Status: DISPENSED
Start: 2023-11-07

## (undated) RX ORDER — DEXAMETHASONE SODIUM PHOSPHATE 4 MG/ML
INJECTION, SOLUTION INTRA-ARTICULAR; INTRALESIONAL; INTRAMUSCULAR; INTRAVENOUS; SOFT TISSUE
Status: DISPENSED
Start: 2023-11-07

## (undated) RX ORDER — FENTANYL CITRATE 50 UG/ML
INJECTION, SOLUTION INTRAMUSCULAR; INTRAVENOUS
Status: DISPENSED
Start: 2023-11-07

## (undated) RX ORDER — CEFAZOLIN SODIUM/WATER 2 G/20 ML
SYRINGE (ML) INTRAVENOUS
Status: DISPENSED
Start: 2023-11-07